# Patient Record
Sex: MALE | Race: WHITE | NOT HISPANIC OR LATINO | Employment: FULL TIME | URBAN - METROPOLITAN AREA
[De-identification: names, ages, dates, MRNs, and addresses within clinical notes are randomized per-mention and may not be internally consistent; named-entity substitution may affect disease eponyms.]

---

## 2020-02-05 ENCOUNTER — OFFICE VISIT (OUTPATIENT)
Dept: FAMILY MEDICINE CLINIC | Facility: CLINIC | Age: 20
End: 2020-02-05
Payer: COMMERCIAL

## 2020-02-05 VITALS
BODY MASS INDEX: 21.86 KG/M2 | TEMPERATURE: 99.3 F | OXYGEN SATURATION: 97 % | HEART RATE: 91 BPM | SYSTOLIC BLOOD PRESSURE: 106 MMHG | WEIGHT: 147.6 LBS | RESPIRATION RATE: 16 BRPM | DIASTOLIC BLOOD PRESSURE: 76 MMHG | HEIGHT: 69 IN

## 2020-02-05 DIAGNOSIS — Z11.4 SCREENING FOR HIV (HUMAN IMMUNODEFICIENCY VIRUS): ICD-10-CM

## 2020-02-05 DIAGNOSIS — Z00.00 WELL ADULT EXAM: Primary | ICD-10-CM

## 2020-02-05 DIAGNOSIS — E55.9 VITAMIN D DEFICIENCY: ICD-10-CM

## 2020-02-05 DIAGNOSIS — Z11.1 SCREENING-PULMONARY TB: ICD-10-CM

## 2020-02-05 LAB
BASOPHILS # BLD AUTO: 0.03 THOUSANDS/ΜL (ref 0–0.1)
BASOPHILS NFR BLD AUTO: 0 % (ref 0–1)
EOSINOPHIL # BLD AUTO: 0.08 THOUSAND/ΜL (ref 0–0.61)
EOSINOPHIL NFR BLD AUTO: 1 % (ref 0–6)
ERYTHROCYTE [DISTWIDTH] IN BLOOD BY AUTOMATED COUNT: 14.1 % (ref 11.6–15.1)
HCT VFR BLD AUTO: 45.4 % (ref 36.5–49.3)
HGB BLD-MCNC: 14.8 G/DL (ref 12–17)
IMM GRANULOCYTES # BLD AUTO: 0.02 THOUSAND/UL (ref 0–0.2)
IMM GRANULOCYTES NFR BLD AUTO: 0 % (ref 0–2)
LYMPHOCYTES # BLD AUTO: 1.68 THOUSANDS/ΜL (ref 0.6–4.47)
LYMPHOCYTES NFR BLD AUTO: 19 % (ref 14–44)
MCH RBC QN AUTO: 27.7 PG (ref 26.8–34.3)
MCHC RBC AUTO-ENTMCNC: 32.6 G/DL (ref 31.4–37.4)
MCV RBC AUTO: 85 FL (ref 82–98)
MONOCYTES # BLD AUTO: 0.7 THOUSAND/ΜL (ref 0.17–1.22)
MONOCYTES NFR BLD AUTO: 8 % (ref 4–12)
NEUTROPHILS # BLD AUTO: 6.35 THOUSANDS/ΜL (ref 1.85–7.62)
NEUTS SEG NFR BLD AUTO: 72 % (ref 43–75)
NRBC BLD AUTO-RTO: 0 /100 WBCS
PLATELET # BLD AUTO: 320 THOUSANDS/UL (ref 149–390)
PMV BLD AUTO: 10.7 FL (ref 8.9–12.7)
RBC # BLD AUTO: 5.35 MILLION/UL (ref 3.88–5.62)
WBC # BLD AUTO: 8.86 THOUSAND/UL (ref 4.31–10.16)

## 2020-02-05 PROCEDURE — 36415 COLL VENOUS BLD VENIPUNCTURE: CPT | Performed by: NURSE PRACTITIONER

## 2020-02-05 PROCEDURE — 82306 VITAMIN D 25 HYDROXY: CPT | Performed by: NURSE PRACTITIONER

## 2020-02-05 PROCEDURE — 80053 COMPREHEN METABOLIC PANEL: CPT | Performed by: NURSE PRACTITIONER

## 2020-02-05 PROCEDURE — 85025 COMPLETE CBC W/AUTO DIFF WBC: CPT | Performed by: NURSE PRACTITIONER

## 2020-02-05 PROCEDURE — 87389 HIV-1 AG W/HIV-1&-2 AB AG IA: CPT | Performed by: NURSE PRACTITIONER

## 2020-02-05 PROCEDURE — 99395 PREV VISIT EST AGE 18-39: CPT | Performed by: NURSE PRACTITIONER

## 2020-02-05 PROCEDURE — 80061 LIPID PANEL: CPT | Performed by: NURSE PRACTITIONER

## 2020-02-05 PROCEDURE — 86580 TB INTRADERMAL TEST: CPT

## 2020-02-05 PROCEDURE — 3008F BODY MASS INDEX DOCD: CPT | Performed by: NURSE PRACTITIONER

## 2020-02-05 NOTE — PROGRESS NOTES
Oscar Suggs is a 23 y o   male and is here for routine health maintenance  The patient reports no problems  History of Present Illness     HPI    Well Adult Physical   Patient here for a comprehensive physical exam       Diet and Physical Activity  Diet: trying to eat healthy   Weight concerns: None, patient's BMI is between 18 5-24 9  Exercise: several times per week      Depression Screen  PHQ-9 Depression Screening    PHQ-9:    Frequency of the following problems over the past two weeks:       Little interest or pleasure in doing things:  0 - not at all  Feeling down, depressed, or hopeless:  0 - not at all  Trouble falling or staying asleep, or sleeping too much:  0 - not at all  Feeling tired or having little energy:  0 - not at all  Poor appetite or overeatin - not at all  Feeling bad about yourself - or that you are a failure or have let yourself or your family down:  0 - not at all  Trouble concentrating on things, such as reading the newspaper or watching television:  0 - not at all  Moving or speaking so slowly that other people could have noticed   Or the opposite - being so fidgety or restless that you have been moving around a lot more than usual:  0 - not at all  Thoughts that you would be better off dead, or of hurting yourself in some way:  0 - not at all  PHQ-2 Score:  0          General Health  Hearing: Normal:  bilateral  Vision: no vision problems, goes for regular eye exams and most recent eye exam <1 year  Dental: regular dental visits, brushes teeth once daily and flosses teeth occasionally          Smoker vaping   Annual screening with low-dose helical computed tomography (CT) for patients age 54 to 76 years with history of smoking at least 30 pack-years and, if a former smoker, had quit within the previous 15 years      The following portions of the patient's history were reviewed and updated as appropriate: allergies, current medications, past family history, past medical history, past social history, past surgical history and problem list     Review of Systems     Review of Systems   Constitutional: Negative for chills, fatigue and fever  HENT: Negative for congestion, ear pain, postnasal drip, rhinorrhea, sinus pressure and sore throat  Eyes: Negative for pain and visual disturbance  Respiratory: Negative for cough, shortness of breath and wheezing  Cardiovascular: Negative for chest pain, palpitations and leg swelling  Gastrointestinal: Negative for abdominal pain, blood in stool, constipation, diarrhea, nausea and vomiting  Endocrine: Negative for cold intolerance, heat intolerance, polydipsia, polyphagia and polyuria  Genitourinary: Negative for discharge, dysuria, flank pain, frequency, scrotal swelling, testicular pain and urgency  Musculoskeletal: Negative for arthralgias, back pain and gait problem  Skin: Negative for rash  Allergic/Immunologic: Negative for environmental allergies and food allergies  Neurological: Negative for dizziness and headaches  Hematological: Does not bruise/bleed easily  Psychiatric/Behavioral: Negative for dysphoric mood  The patient is not nervous/anxious          Past Medical History     Past Medical History:   Diagnosis Date    Acne     History of bilateral inguinal hernias        Past Surgical History     Past Surgical History:   Procedure Laterality Date    INGUINAL HERNIA REPAIR  2000       Social History     Social History     Socioeconomic History    Marital status: Single     Spouse name: None    Number of children: None    Years of education: None    Highest education level: None   Occupational History    None   Social Needs    Financial resource strain: None    Food insecurity:     Worry: None     Inability: None    Transportation needs:     Medical: None     Non-medical: None   Tobacco Use    Smoking status: Never Smoker    Smokeless tobacco: Never Used   Substance and Sexual Activity  Alcohol use: Not Currently     Frequency: Never    Drug use: Never    Sexual activity: Yes     Partners: Female     Birth control/protection: Condom Male   Lifestyle    Physical activity:     Days per week: None     Minutes per session: None    Stress: None   Relationships    Social connections:     Talks on phone: None     Gets together: None     Attends Yazidism service: None     Active member of club or organization: None     Attends meetings of clubs or organizations: None     Relationship status: None    Intimate partner violence:     Fear of current or ex partner: None     Emotionally abused: None     Physically abused: None     Forced sexual activity: None   Other Topics Concern    None   Social History Narrative    Attends [employer], grade 12, B's and C's       Family History     Family History   Problem Relation Age of Onset    No Known Problems Mother     No Known Problems Father     No Known Problems Maternal Grandmother     No Known Problems Maternal Grandfather     No Known Problems Paternal Grandmother     No Known Problems Paternal Grandfather        Current Medications     No current outpatient medications on file  Allergies     No Known Allergies    Objective     /76 (BP Location: Left arm, Patient Position: Sitting, Cuff Size: Adult)   Pulse 91   Temp 99 3 °F (37 4 °C) (Tympanic)   Resp 16   Ht 5' 9" (1 753 m)   Wt 67 kg (147 lb 9 6 oz)   SpO2 97%   BMI 21 80 kg/m²      Physical Exam   Constitutional: He is oriented to person, place, and time  He appears well-developed and well-nourished  HENT:   Head: Normocephalic  Right Ear: Tympanic membrane, external ear and ear canal normal    Left Ear: Tympanic membrane, external ear and ear canal normal    Nose: Nose normal    Eyes: Pupils are equal, round, and reactive to light  Conjunctivae and EOM are normal    Neck: Normal range of motion  Neck supple  No thyromegaly present     Cardiovascular: Normal rate, regular rhythm and normal heart sounds  Pulmonary/Chest: Effort normal and breath sounds normal    Abdominal: Soft  Bowel sounds are normal  He exhibits no distension and no mass  There is no tenderness  There is no rebound and no guarding  Musculoskeletal: Normal range of motion  Lymphadenopathy:     He has no cervical adenopathy  Neurological: He is alert and oriented to person, place, and time  He has normal reflexes  Skin: Skin is warm and dry  Psychiatric: He has a normal mood and affect  His behavior is normal          No exam data present    Health Maintenance     Health Maintenance   Topic Date Due    HPV Vaccine (1 - Male 2-dose series) 08/19/2011    HIV Screening  08/19/2015    DTaP,Tdap,and Td Vaccines (2 - Td) 08/09/2016    Annual Physical  08/19/2018    Influenza Vaccine  07/01/2019    Depression Screening PHQ  02/05/2021    BMI: Adult  02/05/2021    Pneumococcal Vaccine: 65+ Years (1 of 2 - PCV13) 08/19/2065    Pneumococcal Vaccine: Pediatrics (0 to 5 Years) and At-Risk Patients (6 to 59 Years)  Aged Out    HIB Vaccine  Aged Out    Hepatitis B Vaccine  Aged Out    IPV Vaccine  Aged Out    Hepatitis A Vaccine  Aged Out    Meningococcal ACWY Vaccine  Aged [employer] History   Administered Date(s) Administered    Tdap 07/12/2016       Laboratory Results:   No results found for: WBC, HGB, HCT, MCV, PLT  No results found for: BUN  No results found for: GLUC, ALT, AST  No results found for: TSH  No results found for: HGBA1C    Lipid Profile:   No results found for: CHOL  No results found for: HDL  No results found for: LDLC  No results found for: LDLCALC  No results found for: TRIG    Assessment/Plan   1  Well adult exam  - CBC and differential  - Comprehensive metabolic panel  - Lipid panel    2  Vitamin D deficiency  - Vitamin D 25 hydroxy    3  Screening for HIV (human immunodeficiency virus)  - HIV 1/2 AG-AB combo    4   Screening-pulmonary TB  - TB Skin Test    1  Healthy male exam   2  Patient Counseling:   · Nutrition: Stressed importance of a well balanced diet, moderation of sodium/saturated fat, caloric balance and sufficient intake of fiber  · Exercise: Stressed the importance of regular exercise with a goal of 150 minutes per week  · Dental Health: Discussed daily flossing and brushing and regular dental visits   · Sexuality: Discussed sexually transmitted infections, use of condoms and prevention of unintended pregnancy  · Alcohol Use:  Recommended moderation of alcohol intake  · Injury Prevention: Discussed Safety Belts, Safety Helmets, and Smoke Detectors    · Immunizations reviewed  yes  · Discussed benefits of screening yes  · Discussed the patient's BMI with him  The BMI is in the acceptable range  3  Cancer Screening N/A  4  Labs ordered  5  Follow up next physical in 1 year      DIANA Garcia

## 2020-02-05 NOTE — PATIENT INSTRUCTIONS
Testicular Self-examination   WHAT YOU NEED TO KNOW:   A testicular self-examination (LEAH) is a way to check your testicles for lumps and other changes  The main sign of testicular cancer is a lump on the testicle  TSEs can help you learn how your testicles normally look and feel  Regular TSEs can help you find lumps or changes that you should tell your healthcare provider about  Testicular cancer is often curable if it is found early  Ask your healthcare provider to teach you how to do a LEAH if you are not sure how to do it correctly  DISCHARGE INSTRUCTIONS:   Contact your healthcare provider if:   · You have any questions about how to do a LEAH  · You have aching or discomfort in your lower abdomen or groin  · You find any lumps or changes in your testicles  When to do a LEAH:  You may start checking your testicles regularly after you have gone through puberty  An easy way to remember to do a LEAH is to do the exam on the same day of each month  Talk to your healthcare provider about how often to do TSEs  The best time is after a warm shower or bath  This is when your scrotum is most relaxed  How to do a LEAH:   ·  front of the mirror and look at your scrotum  Look for changes in its shape, size, and color  It may be normal for one side of your scrotum to appear larger or hang lower than the other  It is also normal for one testicle to be a little larger than the other  · Use both hands to examine each testicle  Hold 1 testicle between your thumbs and pointer fingers  Gently roll the testicle between your thumbs and fingers  Use some pressure as you roll, but do not squeeze the testicle  A LEAH should not cause pain  Feel for lumps or changes in the testicle and scrotum  Repeat these steps for the other testicle  Follow up with your healthcare provider as directed:  A LEAH should not be the only cancer screening you have   You will still need regular exams to check for cancer or other problems that need to be treated  Ask your healthcare provider how often to be checked  Write down your questions so you remember to ask them during your visits  © 2017 [address] Information is for End User's use only and may not be sold, redistributed or otherwise used for commercial purposes  All illustrations and images included in CareNotes® are the copyrighted property of A D A M , Inc  or Celso Fiore  The above information is an  only  It is not intended as medical advice for individual conditions or treatments  Talk to your doctor, nurse or pharmacist before following any medical regimen to see if it is safe and effective for you

## 2020-02-06 LAB
25(OH)D3 SERPL-MCNC: 22 NG/ML (ref 30–100)
ALBUMIN SERPL BCP-MCNC: 4.4 G/DL (ref 3.5–5)
ALP SERPL-CCNC: 84 U/L (ref 46–484)
ALT SERPL W P-5'-P-CCNC: 16 U/L (ref 12–78)
ANION GAP SERPL CALCULATED.3IONS-SCNC: 6 MMOL/L (ref 4–13)
AST SERPL W P-5'-P-CCNC: 12 U/L (ref 5–45)
BILIRUB SERPL-MCNC: 0.49 MG/DL (ref 0.2–1)
BUN SERPL-MCNC: 12 MG/DL (ref 5–25)
CALCIUM SERPL-MCNC: 9.5 MG/DL (ref 8.3–10.1)
CHLORIDE SERPL-SCNC: 107 MMOL/L (ref 100–108)
CHOLEST SERPL-MCNC: 128 MG/DL (ref 50–200)
CO2 SERPL-SCNC: 28 MMOL/L (ref 21–32)
CREAT SERPL-MCNC: 0.92 MG/DL (ref 0.6–1.3)
GFR SERPL CREATININE-BSD FRML MDRD: 120 ML/MIN/1.73SQ M
GLUCOSE P FAST SERPL-MCNC: 84 MG/DL (ref 65–99)
HDLC SERPL-MCNC: 46 MG/DL
LDLC SERPL CALC-MCNC: 74 MG/DL (ref 0–100)
NONHDLC SERPL-MCNC: 82 MG/DL
POTASSIUM SERPL-SCNC: 4.3 MMOL/L (ref 3.5–5.3)
PROT SERPL-MCNC: 8.7 G/DL (ref 6.4–8.2)
SODIUM SERPL-SCNC: 141 MMOL/L (ref 136–145)
TRIGL SERPL-MCNC: 38 MG/DL

## 2020-02-07 ENCOUNTER — CLINICAL SUPPORT (OUTPATIENT)
Dept: FAMILY MEDICINE CLINIC | Facility: CLINIC | Age: 20
End: 2020-02-07

## 2020-02-07 DIAGNOSIS — Z11.1 ENCOUNTER FOR PPD SKIN TEST READING: Primary | ICD-10-CM

## 2020-02-07 LAB
HIV 1+2 AB+HIV1 P24 AG SERPL QL IA: NORMAL
INDURATION: 0.1 MM
TB SKIN TEST: NEGATIVE

## 2020-02-10 ENCOUNTER — DOCUMENTATION (OUTPATIENT)
Dept: FAMILY MEDICINE CLINIC | Facility: CLINIC | Age: 20
End: 2020-02-10

## 2021-03-31 ENCOUNTER — TELEPHONE (OUTPATIENT)
Dept: ADMINISTRATIVE | Facility: OTHER | Age: 21
End: 2021-03-31

## 2021-03-31 ENCOUNTER — OFFICE VISIT (OUTPATIENT)
Dept: FAMILY MEDICINE CLINIC | Facility: CLINIC | Age: 21
End: 2021-03-31
Payer: COMMERCIAL

## 2021-03-31 VITALS
WEIGHT: 167 LBS | DIASTOLIC BLOOD PRESSURE: 76 MMHG | HEIGHT: 69 IN | SYSTOLIC BLOOD PRESSURE: 126 MMHG | RESPIRATION RATE: 20 BRPM | TEMPERATURE: 98.1 F | HEART RATE: 96 BPM | OXYGEN SATURATION: 97 % | BODY MASS INDEX: 24.73 KG/M2

## 2021-03-31 DIAGNOSIS — Z00.00 WELL ADULT EXAM: Primary | ICD-10-CM

## 2021-03-31 DIAGNOSIS — E55.9 VITAMIN D DEFICIENCY: ICD-10-CM

## 2021-03-31 PROCEDURE — 3008F BODY MASS INDEX DOCD: CPT | Performed by: NURSE PRACTITIONER

## 2021-03-31 PROCEDURE — 3725F SCREEN DEPRESSION PERFORMED: CPT | Performed by: NURSE PRACTITIONER

## 2021-03-31 PROCEDURE — 99395 PREV VISIT EST AGE 18-39: CPT | Performed by: NURSE PRACTITIONER

## 2021-03-31 PROCEDURE — 1036F TOBACCO NON-USER: CPT | Performed by: NURSE PRACTITIONER

## 2021-03-31 NOTE — TELEPHONE ENCOUNTER
Upon review of the In Basket request we were able to locate, review, and update the patient chart as requested for Immunization(s) Tdap is already updated to  from 7/12/16  Any additional questions or concerns should be emailed to the Practice Liaisons via [email or web address] email, please do not reply via In Basket      Thank you  Jhonathan Chris

## 2021-03-31 NOTE — PATIENT INSTRUCTIONS
Testicular Self-examination   AMBULATORY CARE:   A testicular self-examination (LEAH)  is a way to check your testicles for lumps and other changes  The main sign of testicular cancer is a lump on the testicle  TSEs can help you learn how your testicles normally look and feel  Regular TSEs can help you find lumps or changes that you should tell your healthcare provider about  Testicular cancer is often curable if it is found early  Ask your healthcare provider to teach you how to do a LEAH if you are not sure how to do it correctly  When to do a LEAH:  You may start checking your testicles regularly after you have gone through puberty  An easy way to remember to do a LEAH is to do the exam on the same day of each month  Talk to your healthcare provider about how often to do TSEs  The best time is after a warm shower or bath  This is when your scrotum is most relaxed  How to do a LEAH:   ·  front of the mirror and look at your scrotum  Look for changes in its shape, size, and color  It may be normal for one side of your scrotum to appear larger or hang lower than the other  It is also normal for one testicle to be a little larger than the other  · Use both hands to examine each testicle  Hold 1 testicle between your thumbs and pointer fingers  Gently roll the testicle between your thumbs and fingers  Use some pressure as you roll, but do not squeeze the testicle  A LEAH should not cause pain  Feel for lumps or changes in the testicle and scrotum  Repeat these steps for the other testicle  Contact your healthcare provider if:   · You have any questions about how to do a LEAH  · You have aching or discomfort in your lower abdomen or groin  · You find any lumps or changes in your testicles  Follow up with your healthcare provider as directed:  A LEAH should not be the only cancer screening you have  You will still need regular exams to check for cancer or other problems that need to be treated   Ask your healthcare provider how often to be checked  Write down your questions so you remember to ask them during your visits  © Copyright [address] Information is for End User's use only and may not be sold, redistributed or otherwise used for commercial purposes  All illustrations and images included in CareNotes® are the copyrighted property of A D A M , Inc  or Rufus Casas   The above information is an  only  It is not intended as medical advice for individual conditions or treatments  Talk to your doctor, nurse or pharmacist before following any medical regimen to see if it is safe and effective for you

## 2021-03-31 NOTE — PROGRESS NOTES
Tabitha Abbott is a 21 y o   male and is here for routine health maintenance  The patient reports no problems  History of Present Illness     HPI    Well Adult Physical   Patient here for a comprehensive physical exam       Diet and Physical Activity  Diet: well balanced diet  Weight concerns: None, patient's BMI is between 18 5-24 9  Exercise: daily      Depression Screen  PHQ-9 Depression Screening    PHQ-9:   Frequency of the following problems over the past two weeks:      Little interest or pleasure in doing things: 0 - not at all  Feeling down, depressed, or hopeless: 0 - not at all  PHQ-2 Score: 0          General Health  Hearing: Normal:  bilateral  Vision: vision problems: Color Blind and most recent eye exam >1 year  Dental: regular dental visits, brushes teeth once daily and flosses teeth occasionally      Cancer Screening  Colononoscopy Not indicated  PSA Not indicated    Smoker Non-smoker   Annual screening with low-dose helical computed tomography (CT) for patients age 54 to 76 years with history of smoking at least 30 pack-years and, if a former smoker, had quit within the previous 15 years      The following portions of the patient's history were reviewed and updated as appropriate: allergies, current medications, past family history, past medical history, past social history, past surgical history and problem list     Review of Systems     Review of Systems   Constitutional: Negative for chills, fatigue and fever  HENT: Negative for congestion, ear pain, postnasal drip, rhinorrhea, sinus pressure and sore throat  Eyes: Negative for pain and visual disturbance  Respiratory: Negative for cough, shortness of breath and wheezing  Cardiovascular: Negative for chest pain, palpitations and leg swelling  Gastrointestinal: Negative for abdominal pain, blood in stool, constipation, diarrhea, nausea and vomiting  Endocrine: Negative for cold intolerance and heat intolerance  Genitourinary: Negative for discharge, dysuria, flank pain, frequency, hematuria, scrotal swelling, testicular pain and urgency  Musculoskeletal: Negative for arthralgias, back pain and gait problem  Skin: Negative for rash  Allergic/Immunologic: Positive for environmental allergies (seasonal)  Negative for food allergies  Neurological: Negative for dizziness and headaches  Hematological: Does not bruise/bleed easily  Psychiatric/Behavioral: Negative for dysphoric mood  The patient is not nervous/anxious  All other systems reviewed and are negative  Past Medical History     Past Medical History:   Diagnosis Date    Acne     History of bilateral inguinal hernias        Past Surgical History     Past Surgical History:   Procedure Laterality Date    INGUINAL HERNIA REPAIR  2000       Social History     Social History     Socioeconomic History    Marital status: Single     Spouse name: None    Number of children: None    Years of education: None    Highest education level: None   Occupational History    None   Social Needs    Financial resource strain: None    Food insecurity     Worry: None     Inability: None    Transportation needs     Medical: None     Non-medical: None   Tobacco Use    Smoking status: Never Smoker    Smokeless tobacco: Former User   Substance and Sexual Activity    Alcohol use:  Yes     Alcohol/week: 3 0 standard drinks     Types: 1 Glasses of wine, 1 Cans of beer, 1 Shots of liquor per week     Frequency: Monthly or less     Drinks per session: 3 or 4     Binge frequency: Never    Drug use: Yes     Types: Marijuana    Sexual activity: Not Currently     Partners: Female     Birth control/protection: Condom Male   Lifestyle    Physical activity     Days per week: None     Minutes per session: None    Stress: None   Relationships    Social connections     Talks on phone: None     Gets together: None     Attends Yarsani service: None     Active member of club or organization: None     Attends meetings of clubs or organizations: None     Relationship status: None    Intimate partner violence     Fear of current or ex partner: None     Emotionally abused: None     Physically abused: None     Forced sexual activity: None   Other Topics Concern    None   Social History Narrative    Attends [employer], grade 12, B's and C's       Family History     Family History   Problem Relation Age of Onset    No Known Problems Mother     No Known Problems Father     No Known Problems Maternal Grandmother     No Known Problems Maternal Grandfather     No Known Problems Paternal Grandmother     No Known Problems Paternal Grandfather        Current Medications     No current outpatient medications on file  Allergies     No Known Allergies    Objective     /76 (BP Location: Left arm, Patient Position: Sitting, Cuff Size: Standard)   Pulse 96   Temp 98 1 °F (36 7 °C) (Temporal)   Resp 20   Ht 5' 9" (1 753 m)   Wt 75 8 kg (167 lb)   SpO2 97%   BMI 24 66 kg/m²      Physical Exam  Constitutional:       Appearance: Normal appearance  HENT:      Head: Normocephalic  Right Ear: Tympanic membrane normal       Left Ear: Tympanic membrane normal       Nose: Nose normal       Mouth/Throat:      Mouth: Mucous membranes are moist    Eyes:      Extraocular Movements: Extraocular movements intact  Conjunctiva/sclera: Conjunctivae normal       Pupils: Pupils are equal, round, and reactive to light  Neck:      Musculoskeletal: Normal range of motion  No muscular tenderness  Cardiovascular:      Rate and Rhythm: Normal rate and regular rhythm  Pulses: Normal pulses  Heart sounds: Normal heart sounds  Pulmonary:      Effort: Pulmonary effort is normal       Breath sounds: Normal breath sounds  Abdominal:      General: Abdomen is flat  Bowel sounds are normal       Tenderness: There is no abdominal tenderness   There is no right CVA tenderness or left CVA tenderness  Musculoskeletal: Normal range of motion  General: No swelling or tenderness  Right lower leg: No edema  Left lower leg: No edema  Skin:     General: Skin is warm and dry  Neurological:      Mental Status: He is alert and oriented to person, place, and time  Psychiatric:         Mood and Affect: Mood normal          Behavior: Behavior normal            No exam data present    Health Maintenance     Health Maintenance   Topic Date Due    HPV Vaccine (1 - Male 2-dose series) Never done    DTaP,Tdap,and Td Vaccines (2 - Td) 08/09/2016    Annual Physical  02/05/2021    Influenza Vaccine (1) 06/30/2021 (Originally 9/1/2020)    Depression Screening PHQ  03/31/2022    BMI: Adult  03/31/2022    HIV Screening  Completed    Pneumococcal Vaccine: Pediatrics (0 to 5 Years) and At-Risk Patients (6 to 59 Years)  Aged Out    HIB Vaccine  Aged Out    Hepatitis B Vaccine  Aged Out    IPV Vaccine  Aged Out    Hepatitis A Vaccine  Aged Out    Meningococcal ACWY Vaccine  Aged [employer] History   Administered Date(s) Administered    Tdap 07/12/2016    Tuberculin Skin Test-PPD Intradermal 02/05/2020       Laboratory Results:   Lab Results   Component Value Date    WBC 8 86 02/05/2020    HGB 14 8 02/05/2020    HCT 45 4 02/05/2020    MCV 85 02/05/2020     02/05/2020     Lab Results   Component Value Date    BUN 12 02/05/2020     Lab Results   Component Value Date    ALT 16 02/05/2020    AST 12 02/05/2020     No results found for: TSH  No results found for: HGBA1C    Lipid Profile:   No results found for: CHOL  Lab Results   Component Value Date    HDL 46 02/05/2020     No results found for: [facility]  Lab Results   Component Value Date    LDLCALC 74 02/05/2020     Lab Results   Component Value Date    TRIG 38 02/05/2020       Assessment/Plan   1  Well adult exam  - CBC and differential; Future  - Comprehensive metabolic panel; Future  - Lipid panel; Future    2  Vitamin D deficiency  - Vitamin D 25 hydroxy; Future      1  Healthy male exam   2  Patient Counseling:   · Nutrition: Stressed importance of a well balanced diet, moderation of sodium/saturated fat, caloric balance and sufficient intake of fiber  · Exercise: Stressed the importance of regular exercise with a goal of 150 minutes per week  · Dental Health: Discussed daily flossing and brushing and regular dental visits     · Immunizations reviewed  Reviewed  · Discussed benefits of screening Discussed and provided patient patient with education  · Discussed the patient's BMI with him  The BMI is in the acceptable range  3  Cancer Screening Not indicated  4  Labs Ordered  6  Follow up next physical in 1 year      DIANA Thomas

## 2021-03-31 NOTE — TELEPHONE ENCOUNTER
----- Message from [place], [place] sent at 3/31/2021 10:18 AM EDT -----  Tdap  done  2016    in immunizations

## 2021-04-01 ENCOUNTER — APPOINTMENT (OUTPATIENT)
Dept: LAB | Facility: MEDICAL CENTER | Age: 21
End: 2021-04-01
Payer: COMMERCIAL

## 2021-04-01 DIAGNOSIS — Z00.00 WELL ADULT EXAM: ICD-10-CM

## 2021-04-01 DIAGNOSIS — E55.9 VITAMIN D DEFICIENCY: ICD-10-CM

## 2021-04-01 LAB
25(OH)D3 SERPL-MCNC: 15.4 NG/ML (ref 30–100)
ALBUMIN SERPL BCP-MCNC: 3.9 G/DL (ref 3.5–5)
ALP SERPL-CCNC: 72 U/L (ref 46–116)
ALT SERPL W P-5'-P-CCNC: 20 U/L (ref 12–78)
ANION GAP SERPL CALCULATED.3IONS-SCNC: 4 MMOL/L (ref 4–13)
AST SERPL W P-5'-P-CCNC: 11 U/L (ref 5–45)
BASOPHILS # BLD AUTO: 0.02 THOUSANDS/ΜL (ref 0–0.1)
BASOPHILS NFR BLD AUTO: 0 % (ref 0–1)
BILIRUB SERPL-MCNC: 0.35 MG/DL (ref 0.2–1)
BUN SERPL-MCNC: 7 MG/DL (ref 5–25)
CALCIUM SERPL-MCNC: 9.2 MG/DL (ref 8.3–10.1)
CHLORIDE SERPL-SCNC: 106 MMOL/L (ref 100–108)
CHOLEST SERPL-MCNC: 111 MG/DL (ref 50–200)
CO2 SERPL-SCNC: 30 MMOL/L (ref 21–32)
CREAT SERPL-MCNC: 0.97 MG/DL (ref 0.6–1.3)
EOSINOPHIL # BLD AUTO: 0.19 THOUSAND/ΜL (ref 0–0.61)
EOSINOPHIL NFR BLD AUTO: 2 % (ref 0–6)
ERYTHROCYTE [DISTWIDTH] IN BLOOD BY AUTOMATED COUNT: 13.7 % (ref 11.6–15.1)
GFR SERPL CREATININE-BSD FRML MDRD: 112 ML/MIN/1.73SQ M
GLUCOSE P FAST SERPL-MCNC: 94 MG/DL (ref 65–99)
HCT VFR BLD AUTO: 47.3 % (ref 36.5–49.3)
HDLC SERPL-MCNC: 52 MG/DL
HGB BLD-MCNC: 14.8 G/DL (ref 12–17)
IMM GRANULOCYTES # BLD AUTO: 0.04 THOUSAND/UL (ref 0–0.2)
IMM GRANULOCYTES NFR BLD AUTO: 0 % (ref 0–2)
LDLC SERPL CALC-MCNC: 50 MG/DL (ref 0–100)
LYMPHOCYTES # BLD AUTO: 1.94 THOUSANDS/ΜL (ref 0.6–4.47)
LYMPHOCYTES NFR BLD AUTO: 19 % (ref 14–44)
MCH RBC QN AUTO: 27.3 PG (ref 26.8–34.3)
MCHC RBC AUTO-ENTMCNC: 31.3 G/DL (ref 31.4–37.4)
MCV RBC AUTO: 87 FL (ref 82–98)
MONOCYTES # BLD AUTO: 0.88 THOUSAND/ΜL (ref 0.17–1.22)
MONOCYTES NFR BLD AUTO: 9 % (ref 4–12)
NEUTROPHILS # BLD AUTO: 7.19 THOUSANDS/ΜL (ref 1.85–7.62)
NEUTS SEG NFR BLD AUTO: 70 % (ref 43–75)
NONHDLC SERPL-MCNC: 59 MG/DL
NRBC BLD AUTO-RTO: 0 /100 WBCS
PLATELET # BLD AUTO: 228 THOUSANDS/UL (ref 149–390)
PMV BLD AUTO: 10.7 FL (ref 8.9–12.7)
POTASSIUM SERPL-SCNC: 3.9 MMOL/L (ref 3.5–5.3)
PROT SERPL-MCNC: 7.7 G/DL (ref 6.4–8.2)
RBC # BLD AUTO: 5.43 MILLION/UL (ref 3.88–5.62)
SODIUM SERPL-SCNC: 140 MMOL/L (ref 136–145)
TRIGL SERPL-MCNC: 43 MG/DL
WBC # BLD AUTO: 10.26 THOUSAND/UL (ref 4.31–10.16)

## 2021-04-01 PROCEDURE — 80061 LIPID PANEL: CPT

## 2021-04-01 PROCEDURE — 82306 VITAMIN D 25 HYDROXY: CPT

## 2021-04-01 PROCEDURE — 85025 COMPLETE CBC W/AUTO DIFF WBC: CPT

## 2021-04-01 PROCEDURE — 36415 COLL VENOUS BLD VENIPUNCTURE: CPT

## 2021-04-01 PROCEDURE — 80053 COMPREHEN METABOLIC PANEL: CPT

## 2021-04-14 ENCOUNTER — TELEPHONE (OUTPATIENT)
Dept: FAMILY MEDICINE CLINIC | Facility: CLINIC | Age: 21
End: 2021-04-14

## 2021-04-14 NOTE — TELEPHONE ENCOUNTER
Iris from Dr Meka Hancock office called - patient is scheduled for complete bony impacted tooth extraction tomorrow, wants to know if you are agreeable to sign a clearance  Patient was seen 3/31/21 for yearly PE   Please advise

## 2021-12-22 ENCOUNTER — OFFICE VISIT (OUTPATIENT)
Dept: FAMILY MEDICINE CLINIC | Facility: CLINIC | Age: 21
End: 2021-12-22
Payer: COMMERCIAL

## 2021-12-22 VITALS
HEIGHT: 69 IN | WEIGHT: 174 LBS | SYSTOLIC BLOOD PRESSURE: 122 MMHG | RESPIRATION RATE: 18 BRPM | HEART RATE: 97 BPM | TEMPERATURE: 98.1 F | BODY MASS INDEX: 25.77 KG/M2 | OXYGEN SATURATION: 99 % | DIASTOLIC BLOOD PRESSURE: 74 MMHG

## 2021-12-22 DIAGNOSIS — R19.5 MUCUS IN STOOL: ICD-10-CM

## 2021-12-22 DIAGNOSIS — R19.5 OCCULT BLOOD POSITIVE STOOL: Primary | ICD-10-CM

## 2021-12-22 PROCEDURE — 1036F TOBACCO NON-USER: CPT | Performed by: NURSE PRACTITIONER

## 2021-12-22 PROCEDURE — 99214 OFFICE O/P EST MOD 30 MIN: CPT | Performed by: NURSE PRACTITIONER

## 2021-12-22 PROCEDURE — 3008F BODY MASS INDEX DOCD: CPT | Performed by: NURSE PRACTITIONER

## 2021-12-23 ENCOUNTER — APPOINTMENT (OUTPATIENT)
Dept: LAB | Facility: MEDICAL CENTER | Age: 21
End: 2021-12-23
Payer: COMMERCIAL

## 2021-12-23 LAB
ALBUMIN SERPL BCP-MCNC: 4 G/DL (ref 3.5–5)
ALP SERPL-CCNC: 84 U/L (ref 46–116)
ALT SERPL W P-5'-P-CCNC: 31 U/L (ref 12–78)
ANION GAP SERPL CALCULATED.3IONS-SCNC: 5 MMOL/L (ref 4–13)
AST SERPL W P-5'-P-CCNC: 17 U/L (ref 5–45)
BASOPHILS # BLD AUTO: 0.04 THOUSANDS/ΜL (ref 0–0.1)
BASOPHILS NFR BLD AUTO: 1 % (ref 0–1)
BILIRUB SERPL-MCNC: 0.66 MG/DL (ref 0.2–1)
BUN SERPL-MCNC: 11 MG/DL (ref 5–25)
CALCIUM SERPL-MCNC: 9.4 MG/DL (ref 8.3–10.1)
CHLORIDE SERPL-SCNC: 105 MMOL/L (ref 100–108)
CO2 SERPL-SCNC: 30 MMOL/L (ref 21–32)
CREAT SERPL-MCNC: 1.11 MG/DL (ref 0.6–1.3)
EOSINOPHIL # BLD AUTO: 0.19 THOUSAND/ΜL (ref 0–0.61)
EOSINOPHIL NFR BLD AUTO: 2 % (ref 0–6)
ERYTHROCYTE [DISTWIDTH] IN BLOOD BY AUTOMATED COUNT: 13.6 % (ref 11.6–15.1)
GFR SERPL CREATININE-BSD FRML MDRD: 94 ML/MIN/1.73SQ M
GLUCOSE P FAST SERPL-MCNC: 93 MG/DL (ref 65–99)
HCT VFR BLD AUTO: 46.9 % (ref 36.5–49.3)
HGB BLD-MCNC: 15.1 G/DL (ref 12–17)
IMM GRANULOCYTES # BLD AUTO: 0.02 THOUSAND/UL (ref 0–0.2)
IMM GRANULOCYTES NFR BLD AUTO: 0 % (ref 0–2)
LYMPHOCYTES # BLD AUTO: 2.02 THOUSANDS/ΜL (ref 0.6–4.47)
LYMPHOCYTES NFR BLD AUTO: 25 % (ref 14–44)
MCH RBC QN AUTO: 27.6 PG (ref 26.8–34.3)
MCHC RBC AUTO-ENTMCNC: 32.2 G/DL (ref 31.4–37.4)
MCV RBC AUTO: 86 FL (ref 82–98)
MONOCYTES # BLD AUTO: 0.88 THOUSAND/ΜL (ref 0.17–1.22)
MONOCYTES NFR BLD AUTO: 11 % (ref 4–12)
NEUTROPHILS # BLD AUTO: 5.11 THOUSANDS/ΜL (ref 1.85–7.62)
NEUTS SEG NFR BLD AUTO: 61 % (ref 43–75)
NRBC BLD AUTO-RTO: 0 /100 WBCS
PLATELET # BLD AUTO: 256 THOUSANDS/UL (ref 149–390)
PMV BLD AUTO: 10.3 FL (ref 8.9–12.7)
POTASSIUM SERPL-SCNC: 4.3 MMOL/L (ref 3.5–5.3)
PROT SERPL-MCNC: 8.3 G/DL (ref 6.4–8.2)
RBC # BLD AUTO: 5.48 MILLION/UL (ref 3.88–5.62)
SODIUM SERPL-SCNC: 140 MMOL/L (ref 136–145)
WBC # BLD AUTO: 8.26 THOUSAND/UL (ref 4.31–10.16)

## 2021-12-23 PROCEDURE — 85025 COMPLETE CBC W/AUTO DIFF WBC: CPT | Performed by: NURSE PRACTITIONER

## 2021-12-23 PROCEDURE — 81382 HLA II TYPING 1 LOC HR: CPT | Performed by: NURSE PRACTITIONER

## 2021-12-23 PROCEDURE — 81376 HLA II TYPING 1 LOCUS LR: CPT | Performed by: NURSE PRACTITIONER

## 2021-12-23 PROCEDURE — 36415 COLL VENOUS BLD VENIPUNCTURE: CPT | Performed by: NURSE PRACTITIONER

## 2021-12-23 PROCEDURE — 80053 COMPREHEN METABOLIC PANEL: CPT | Performed by: NURSE PRACTITIONER

## 2021-12-27 ENCOUNTER — OFFICE VISIT (OUTPATIENT)
Dept: GASTROENTEROLOGY | Facility: MEDICAL CENTER | Age: 21
End: 2021-12-27
Payer: COMMERCIAL

## 2021-12-27 VITALS
WEIGHT: 177.4 LBS | HEART RATE: 82 BPM | SYSTOLIC BLOOD PRESSURE: 100 MMHG | TEMPERATURE: 98.2 F | DIASTOLIC BLOOD PRESSURE: 80 MMHG | BODY MASS INDEX: 26.2 KG/M2

## 2021-12-27 DIAGNOSIS — R19.7 DIARRHEA, UNSPECIFIED TYPE: ICD-10-CM

## 2021-12-27 DIAGNOSIS — R19.5 OCCULT BLOOD POSITIVE STOOL: Primary | ICD-10-CM

## 2021-12-27 DIAGNOSIS — R10.30 LOWER ABDOMINAL PAIN: ICD-10-CM

## 2021-12-27 PROCEDURE — 99243 OFF/OP CNSLTJ NEW/EST LOW 30: CPT | Performed by: PHYSICIAN ASSISTANT

## 2021-12-27 NOTE — H&P (VIEW-ONLY)
Assessment/Plan:     Diagnoses and all orders for this visit:    Occult blood positive stool  Abdominal pain  Diarrhea    Patient was referred for rectal bleeding, abdominal pain and diarrhea  He states his symptoms started approximately 6 months ago but have worsened recently  He did have a CBC drawn and his hemoglobin is within normal limits  He does have a family history of Crohn's disease in his younger brother  Given his symptoms this is very concerning for inflammatory bowel disease  Would recommend ruling out infectious etiology but this is unlikely  Given he also complained of dark stools would recommend endoscopy as well as colonoscopy  Risks and benefits of the procedure were discussed and he is agreeable to proceed  Will see him back after to go over all results  If infectious workup is negative can use Imodium as needed  -     Ambulatory referral to Gastroenterology  -     Colonoscopy; Future  -     EGD; Future  -     Giardia antigen; Future  -     Calprotectin,Fecal; Future  -     Clostridium difficile toxin by PCR with EIA; Future  -     Ova and parasite examination; Future  -     Stool Enteric Bacterial Panel by PCR; Future                Subjective:      Patient ID: Chanda Anguiano is a 24 y o  male  HPI     Patient was referred for rectal bleeding  He states his symptoms started approximately 6 months ago  He initially had bleeding intermittently but over the last month he has noticed it every day in his stools  He has also noticed an increased frequency in his stools, at its worst going 5 times per day  He does admit to significant abdominal cramping, bloating and gas  He states typically the blood is red in color but also has noticed some black stools  He denies any heartburn or acid reflux  He has had some occasional nausea, no vomiting  He does have a family history of Crohn's disease in his younger brother  Also has a family history of celiac disease in his uncle    He has never had an endoscopy or colonoscopy  There is no problem list on file for this patient  No Known Allergies  No current outpatient medications on file prior to visit  No current facility-administered medications on file prior to visit  Family History   Problem Relation Age of Onset    No Known Problems Mother     No Known Problems Father     No Known Problems Maternal Grandmother     No Known Problems Maternal Grandfather     No Known Problems Paternal Grandmother     No Known Problems Paternal Grandfather      Past Medical History:   Diagnosis Date    Acne     History of bilateral inguinal hernias      Social History     Socioeconomic History    Marital status: Single     Spouse name: None    Number of children: None    Years of education: None    Highest education level: None   Occupational History    None   Tobacco Use    Smoking status: Never Smoker    Smokeless tobacco: Former User   Substance and Sexual Activity    Alcohol use: Yes     Alcohol/week: 3 0 standard drinks     Types: 1 Glasses of wine, 1 Cans of beer, 1 Shots of liquor per week    Drug use: Yes     Types: Marijuana    Sexual activity: Not Currently     Partners: Female     Birth control/protection: Condom Male   Other Topics Concern    None   Social History Narrative    Attends [employer], grade 12, B's and C's     Social Determinants of Health     Financial Resource Strain: Not on file   Food Insecurity: Not on file   Transportation Needs: Not on file   Physical Activity: Not on file   Stress: Not on file   Social Connections: Not on file   Intimate Partner Violence: Not on file   Housing Stability: Not on file     Past Surgical History:   Procedure Laterality Date   [address]         Review of Systems   All other systems reviewed and are negative          Objective:      /80   Pulse 82   Temp 98 2 °F (36 8 °C)   Wt 80 5 kg (177 lb 6 4 oz)   BMI 26 20 kg/m²          Physical Exam  Constitutional:       Appearance: Normal appearance  He is well-developed  HENT:      Head: Normocephalic and atraumatic  Eyes:      Conjunctiva/sclera: Conjunctivae normal    Cardiovascular:      Rate and Rhythm: Normal rate and regular rhythm  Pulmonary:      Effort: Pulmonary effort is normal       Breath sounds: Normal breath sounds  Abdominal:      General: Bowel sounds are normal  There is no distension  Palpations: Abdomen is soft  Tenderness: There is abdominal tenderness  Musculoskeletal:         General: Normal range of motion  Cervical back: Normal range of motion  Skin:     General: Skin is warm and dry  Neurological:      Mental Status: He is alert and oriented to person, place, and time     Psychiatric:         Mood and Affect: Mood normal          Behavior: Behavior normal

## 2022-01-06 LAB
ANNOTATION COMMENT IMP: NORMAL
HLA-DQ2 QL: POSITIVE
HLA-DQ8 QL: NEGATIVE
REF LAB TEST METHOD: NORMAL

## 2022-01-10 ENCOUNTER — TELEPHONE (OUTPATIENT)
Dept: GASTROENTEROLOGY | Facility: MEDICAL CENTER | Age: 22
End: 2022-01-10

## 2022-01-10 NOTE — TELEPHONE ENCOUNTER
Called pt and informed there no documentation someone from the GI office reached out today  Pt denied a VM was left or call back number  Pt aware of scheduled scopes, prep instructions, and f/u visit  Advised to call PCP office as he is still awaiting blood work results and to complete ordered stool studies  Pt expressed understanding and had no further questions

## 2022-01-12 NOTE — TELEPHONE ENCOUNTER
OV 12/27/21 [address] pt and informed he can go to a [facility]'s lab and  stool kits, once completed must bring kits back to lab  Pt questioned if scopes will be delayed due to incomplete stool studies  Informed he can keep scope appt on 1/18 and complete stool studies ASAP

## 2022-01-17 ENCOUNTER — TELEPHONE (OUTPATIENT)
Dept: GASTROENTEROLOGY | Facility: MEDICAL CENTER | Age: 22
End: 2022-01-17

## 2022-01-18 ENCOUNTER — ANESTHESIA (OUTPATIENT)
Dept: GASTROENTEROLOGY | Facility: MEDICAL CENTER | Age: 22
End: 2022-01-18

## 2022-01-18 ENCOUNTER — HOSPITAL ENCOUNTER (OUTPATIENT)
Dept: GASTROENTEROLOGY | Facility: MEDICAL CENTER | Age: 22
Setting detail: OUTPATIENT SURGERY
Discharge: HOME | End: 2022-01-18
Admitting: INTERNAL MEDICINE
Payer: COMMERCIAL

## 2022-01-18 ENCOUNTER — ANESTHESIA EVENT (OUTPATIENT)
Dept: GASTROENTEROLOGY | Facility: MEDICAL CENTER | Age: 22
End: 2022-01-18

## 2022-01-18 VITALS
WEIGHT: 175 LBS | BODY MASS INDEX: 25.92 KG/M2 | SYSTOLIC BLOOD PRESSURE: 103 MMHG | OXYGEN SATURATION: 97 % | RESPIRATION RATE: 18 BRPM | HEIGHT: 69 IN | TEMPERATURE: 98.5 F | HEART RATE: 78 BPM | DIASTOLIC BLOOD PRESSURE: 54 MMHG

## 2022-01-18 DIAGNOSIS — R19.5 OCCULT BLOOD POSITIVE STOOL: ICD-10-CM

## 2022-01-18 DIAGNOSIS — R19.7 DIARRHEA, UNSPECIFIED TYPE: ICD-10-CM

## 2022-01-18 DIAGNOSIS — K52.9 COLITIS: Primary | ICD-10-CM

## 2022-01-18 PROCEDURE — 43239 EGD BIOPSY SINGLE/MULTIPLE: CPT | Performed by: INTERNAL MEDICINE

## 2022-01-18 PROCEDURE — 88305 TISSUE EXAM BY PATHOLOGIST: CPT | Performed by: SPECIALIST

## 2022-01-18 PROCEDURE — 45380 COLONOSCOPY AND BIOPSY: CPT | Performed by: INTERNAL MEDICINE

## 2022-01-18 RX ORDER — LIDOCAINE HYDROCHLORIDE 20 MG/ML
INJECTION, SOLUTION EPIDURAL; INFILTRATION; INTRACAUDAL; PERINEURAL AS NEEDED
Status: DISCONTINUED | OUTPATIENT
Start: 2022-01-18 | End: 2022-01-18

## 2022-01-18 RX ORDER — SODIUM CHLORIDE 9 MG/ML
125 INJECTION, SOLUTION INTRAVENOUS CONTINUOUS
Status: DISCONTINUED | OUTPATIENT
Start: 2022-01-18 | End: 2022-01-22 | Stop reason: HOSPADM

## 2022-01-18 RX ORDER — MESALAMINE 1.2 G/1
4800 TABLET, DELAYED RELEASE ORAL
Qty: 120 TABLET | Refills: 3 | Status: SHIPPED | OUTPATIENT
Start: 2022-01-18 | End: 2022-02-17

## 2022-01-18 RX ORDER — MESALAMINE 4 G/60ML
4 ENEMA RECTAL
Qty: 1800 ML | Refills: 3 | Status: SHIPPED | OUTPATIENT
Start: 2022-01-18 | End: 2022-06-05

## 2022-01-18 RX ORDER — PROPOFOL 10 MG/ML
INJECTION, EMULSION INTRAVENOUS AS NEEDED
Status: DISCONTINUED | OUTPATIENT
Start: 2022-01-18 | End: 2022-01-18

## 2022-01-18 RX ADMIN — PROPOFOL 50 MG: 10 INJECTION, EMULSION INTRAVENOUS at 10:19

## 2022-01-18 RX ADMIN — PROPOFOL 50 MG: 10 INJECTION, EMULSION INTRAVENOUS at 10:17

## 2022-01-18 RX ADMIN — SODIUM CHLORIDE 125 ML/HR: 0.9 INJECTION, SOLUTION INTRAVENOUS at 10:59

## 2022-01-18 RX ADMIN — PROPOFOL 50 MG: 10 INJECTION, EMULSION INTRAVENOUS at 10:32

## 2022-01-18 RX ADMIN — LIDOCAINE HYDROCHLORIDE 5 ML: 20 INJECTION, SOLUTION EPIDURAL; INFILTRATION; INTRACAUDAL; PERINEURAL at 10:14

## 2022-01-18 RX ADMIN — SODIUM CHLORIDE 125 ML/HR: 0.9 INJECTION, SOLUTION INTRAVENOUS at 10:05

## 2022-01-18 RX ADMIN — PROPOFOL 50 MG: 10 INJECTION, EMULSION INTRAVENOUS at 10:25

## 2022-01-18 RX ADMIN — PROPOFOL 50 MG: 10 INJECTION, EMULSION INTRAVENOUS at 10:15

## 2022-01-18 RX ADMIN — PROPOFOL 50 MG: 10 INJECTION, EMULSION INTRAVENOUS at 10:16

## 2022-01-18 RX ADMIN — PROPOFOL 100 MG: 10 INJECTION, EMULSION INTRAVENOUS at 10:14

## 2022-01-18 RX ADMIN — PROPOFOL 50 MG: 10 INJECTION, EMULSION INTRAVENOUS at 10:27

## 2022-01-18 RX ADMIN — PROPOFOL 50 MG: 10 INJECTION, EMULSION INTRAVENOUS at 10:30

## 2022-01-18 RX ADMIN — PROPOFOL 50 MG: 10 INJECTION, EMULSION INTRAVENOUS at 10:20

## 2022-01-18 RX ADMIN — PROPOFOL 50 MG: 10 INJECTION, EMULSION INTRAVENOUS at 10:18

## 2022-01-18 RX ADMIN — PROPOFOL 50 MG: 10 INJECTION, EMULSION INTRAVENOUS at 10:28

## 2022-01-18 RX ADMIN — PROPOFOL 50 MG: 10 INJECTION, EMULSION INTRAVENOUS at 10:26

## 2022-01-18 NOTE — NURSING NOTE
Patient still not responding to verbal and tactile stimuli following GI procedures  Dr Sun Frye aware  VS stable except for lower BP  IV NSS wide open

## 2022-01-18 NOTE — ANESTHESIA PREPROCEDURE EVALUATION
Procedure:  COLONOSCOPY  EGD    Relevant Problems   No relevant active problems        Physical Exam    Airway    Mallampati score: II  TM Distance: >3 FB  Neck ROM: full     Dental       Cardiovascular  Rhythm: regular, Rate: normal, Cardiovascular exam normal    Pulmonary  Pulmonary exam normal Breath sounds clear to auscultation,     Other Findings        Anesthesia Plan  ASA Score- 2     Anesthesia Type- IV sedation with anesthesia with ASA Monitors  Additional Monitors:   Airway Plan:           Plan Factors-Exercise tolerance (METS): >4 METS  Chart reviewed  Existing labs reviewed  Patient is not a current smoker  Obstructive sleep apnea risk education given perioperatively  Induction- intravenous  Postoperative Plan-     Informed Consent- Anesthetic plan and risks discussed with patient

## 2022-01-18 NOTE — DISCHARGE INSTRUCTIONS
Hiatal Hernia   WHAT YOU NEED TO KNOW:   A hiatal hernia is a condition that causes part of your stomach to bulge through the hiatus (small opening) in your diaphragm  The part of the stomach may move up and down, or it may get trapped above the diaphragm  DISCHARGE INSTRUCTIONS:   Call your local emergency number (911 in the [address] [facility]) if:   · You have severe chest pain and sudden trouble breathing  Seek care immediately if:   · You have severe abdominal pain  · You try to vomit but nothing comes out (retching)  · Your bowel movements are black or bloody  · Your vomit looks like coffee grounds or has blood in it  Call your doctor if:   · Your symptoms are getting worse  · You have nausea, and you are vomiting  · You are losing weight without trying  · You have questions or concerns about your condition or care  Medicines:   · Medicines  may be given to relieve heartburn symptoms  These medicines help to decrease or block stomach acid  You may also be given medicines that help to tighten the esophageal sphincter  · Take your medicine as directed  Contact your healthcare provider if you think your medicine is not helping or if you have side effects  Tell him or her if you are allergic to any medicine  Keep a list of the medicines, vitamins, and herbs you take  Include the amounts, and when and why you take them  Bring the list or the pill bottles to follow-up visits  Carry your medicine list with you in case of an emergency  Self care: The following nutrition and lifestyle changes may be recommended to relieve symptoms of heartburn:     · Avoid foods that make your symptoms worse  These may include spicy foods, fruit juices, alcohol, caffeine, chocolate, and mint  · Eat several small meals during the day  Small meals give your stomach less food to digest     · Avoid lying down and bending forward after you eat  Do not eat meals 2 to 3 hours before bedtime   This decreases your risk for reflux  · Maintain a healthy weight  If you are overweight, weight loss may help relieve your symptoms  · Sleep with your head elevated  at least 6 inches  · Do not smoke  Smoking can increase your symptoms of heartburn  Follow up with your doctor as directed:  Write down your questions so you remember to ask them during your visits  © Copyright [employer] 2021 Information is for End User's use only and may not be sold, redistributed or otherwise used for commercial purposes  All illustrations and images included in CareNotes® are the copyrighted property of A D A M , Inc  or [facility] [place] Rosemary Casas   The above information is an  only  It is not intended as medical advice for individual conditions or treatments  Talk to your doctor, nurse or pharmacist before following any medical regimen to see if it is safe and effective for you  Colonoscopy   WHAT YOU NEED TO KNOW:   A colonoscopy is a procedure to examine the inside of your colon (intestine) with a scope  Polyps or tissue growths may have been removed during your colonoscopy  It is normal to feel bloated and to have some abdominal discomfort  You should be passing gas  If you have hemorrhoids or you had polyps removed, you may have a small amount of bleeding  DISCHARGE INSTRUCTIONS:   Seek care immediately if:   · You have a large amount of bright red blood in your bowel movements  · Your abdomen is hard and firm and you have severe pain  · You have sudden trouble breathing  Call your doctor if:   · You develop a rash or hives  · You have a fever within 24 hours of your procedure  · You have nausea and vomiting  · You feel anesthesia effects greater than 24 hours  · You have not had a bowel movement for 3 days after your procedure  · You have questions or concerns about your condition or care      After your colonoscopy:   · Do not lift, strain, or run  until your healthcare provider says it is okay  · Rest as much as possible  You have been given medicine to relax you  Do not  drive or make important decisions for at least 24 hours  Return to your normal activity as directed  · Relieve gas and discomfort from bloating  by lying on your left side with a heating pad on your abdomen  You may need to take short walks to help the gas move out  Eat small meals until bloating is relieved  If you had polyps removed: For 7 days after your procedure:  · Do not  take aspirin  · Do not  go on long car rides  Help prevent constipation:   · Eat a variety of healthy foods  Healthy foods include fruit, vegetables, whole-grain breads, low-fat dairy products, beans, lean meat, and fish  Ask if you need to be on a special diet  Your healthcare provider may recommend that you eat high-fiber foods such as cooked beans  Fiber helps you have regular bowel movements  · Drink liquids as directed  Adults should drink between 9 and 13 eight-ounce cups of liquid every day  Ask what amount is best for you  For most people, good liquids to drink are water, juice, and milk  · Exercise as directed  Talk to your healthcare provider about the best exercise plan for you  Exercise can help prevent constipation, decrease your blood pressure and improve your health  Follow up with your doctor as directed:  Write down your questions so you remember to ask them during your visits  © Copyright [employer] 2021 Information is for End User's use only and may not be sold, redistributed or otherwise used for commercial purposes  All illustrations and images included in CareNotes® are the copyrighted property of A D A M , Inc  or [facility] Rosemary Casas   The above information is an  only  It is not intended as medical advice for individual conditions or treatments   Talk to your doctor, nurse or pharmacist before following any medical regimen to see if it is safe and effective for you

## 2022-01-19 ENCOUNTER — TELEPHONE (OUTPATIENT)
Dept: OTHER | Facility: OTHER | Age: 22
End: 2022-01-19

## 2022-01-20 NOTE — TELEPHONE ENCOUNTER
Patient is requesting a call back  He had a colonoscopy on 1/17 and he is suppose to take mesalamine table and inserts, but he is also going to have another test where he can not be taking this medication  He needs to know if he should start this medication or wait

## 2022-01-20 NOTE — TELEPHONE ENCOUNTER
I spoke with him  He is referring to the stool tests that were previously ordered (including fecal calprotectin)  He should complete these ASAP  Ideally it would be prior to starting mesalamine, but if he will complete the testing soon than he can start the mesalamine therapy  Tis was discussed with him  All questions answered

## 2022-02-16 ENCOUNTER — OFFICE VISIT (OUTPATIENT)
Dept: GASTROENTEROLOGY | Facility: MEDICAL CENTER | Age: 22
End: 2022-02-16
Payer: COMMERCIAL

## 2022-02-16 VITALS
WEIGHT: 175.2 LBS | DIASTOLIC BLOOD PRESSURE: 82 MMHG | SYSTOLIC BLOOD PRESSURE: 124 MMHG | HEART RATE: 72 BPM | BODY MASS INDEX: 25.87 KG/M2

## 2022-02-16 DIAGNOSIS — K52.9 COLITIS: ICD-10-CM

## 2022-02-16 DIAGNOSIS — R19.7 DIARRHEA, UNSPECIFIED TYPE: Primary | ICD-10-CM

## 2022-02-16 DIAGNOSIS — R14.0 BLOATING: ICD-10-CM

## 2022-02-16 DIAGNOSIS — R10.30 LOWER ABDOMINAL PAIN: ICD-10-CM

## 2022-02-16 PROCEDURE — 99214 OFFICE O/P EST MOD 30 MIN: CPT | Performed by: INTERNAL MEDICINE

## 2022-02-16 PROCEDURE — 1036F TOBACCO NON-USER: CPT | Performed by: INTERNAL MEDICINE

## 2022-02-16 NOTE — PROGRESS NOTES
[facility] - Outpatient Follow-up Note  Mookie Sosa 24 y o  male MRN: [id number]  Encounter: [id number]          ASSESSMENT AND PLAN:    Mookie Sosa is a 24 y o  male who presents with complaint of rectal bleeding diarrhea is now status post colonoscopy with some rectal inflammation, with biopsies showing acute hepatitis but no chronicity  He does have a family history of Crohn's disease in a brother  He has possible Crohn's but given lack of chronicity this is not a clear diagnosis  He also had intraepithelial lymphocytes on duodenal biopsy but he did not have celiac antibody sent  Since the colonoscopy and starting mesalamine his stools have improved but he does have bloating  This could be related to medication but may also reflect small bowel inflammation  CMP relatively unremrkable, vit D low, CBC normal  Celiac genetics + but no celiac blood work sent  Duodenal biopsies did have IELs    1  Diarrhea, unspecified type    2  Colitis    3  Lower abdominal pain    4  Bloating        Orders Placed This Encounter   Procedures    Calprotectin,Fecal    Calprotectin,Fecal    Clostridium difficile toxin by PCR with EIA    Giardia, EIA, Ova/Parasite    Fecal fat, qualitative    Pancreatic elastase, fecal    MRI enterography w wo    Celiac Disease Antibody Profile    C-reactive protein    C-reactive protein     Decrease the Lialda (mesalamine) to 2 pills per day  Monitor your bloating  Continue rowasa (the mesalamine enema)  Check blood work and stool tests  MRE to evaluate the small bowel  Repeat flex sig in 6 months    ______________________________________________________________________    SUBJECTIVE:    Mookie Sosa is a 24 y o  male who presents with complaint of abnormal colonoscopy  He felt well after the colonoscopy  Last time he had blood in the stool was when he started the medication  Since then only a small amount if any  He has bloating   If he bends over he has pressure and it takes the breath out of him  His pants feel tight  He has fatigue and nausea  No vomiting  He usually has 1-2 BMs per day, formed  He has some abdominal pain around the umbilicus, but it depends on what he is doing  Not related to food or BMs  He is eating the same foods  Sometimes he noticed when he has to pee he has to run there right away  He tries to drink a lot of water  No rashes, mouth sores  Rare joint pains in his arm  No significant heartburn, dysphagia, odynophagia, weight loss  + FH of Crohn's in his younger brother  Also + FH of celiac disease  No FH of colon cancer or other GI related issues      Answers for HPI/ROS submitted by the patient on 2/15/2022  Chronicity: chronic  Onset: more than 1 month ago  Onset quality: gradual  Frequency: daily  Episode duration: 2 hours  Progression since onset: waxing and waning  Pain location: periumbilical region  Pain - numeric: 5/10  Pain quality: aching, cramping, a sensation of fullness  Radiates to: periumbilical region, suprapubic region  anorexia: Yes  arthralgias: No  belching: No  constipation: No  diarrhea: Yes  dysuria: No  fever: No  flatus: Yes  frequency: Yes  headaches: No  hematochezia: Yes  hematuria: No  melena: Yes  myalgias: No  nausea: Yes  weight loss: No  vomiting: No  Aggravated by: being still, certain positions, movement  Relieved by: being still, certain positions, recumbency, standing  Diagnostic workup: lower endoscopy, upper endoscopy        REVIEW OF SYSTEMS IS OTHERWISE NEGATIVE    10 point ROS reviewed and negative, except as above      Historical Information   Past Medical History:   Diagnosis Date    Acne     History of bilateral inguinal hernias      Past Surgical History:   Procedure Laterality Date    INGUINAL HERNIA REPAIR  2000    WISDOM TOOTH EXTRACTION       Social History   Social History     Substance and Sexual Activity   Alcohol Use Yes    Alcohol/week: 3 0 standard drinks    Types: 1 Glasses of wine, 1 Cans of beer, 1 Shots of liquor per week     Social History     Substance and Sexual Activity   Drug Use Yes    Types: Marijuana     Social History     Tobacco Use   Smoking Status Never Smoker   Smokeless Tobacco Former User     Family History   Problem Relation Age of Onset    No Known Problems Mother     No Known Problems Father     No Known Problems Maternal Grandmother     No Known Problems Maternal Grandfather     No Known Problems Paternal Grandmother     No Known Problems Paternal Grandfather        Meds/Allergies       Current Outpatient Medications:     mesalamine (LIALDA) 1 2 g EC tablet    mesalamine (ROWASA) 4 g    No Known Allergies        Objective     Blood pressure 124/82, pulse 72, weight 79 5 kg (175 lb 3 2 oz)  Body mass index is 25 87 kg/m²  PHYSICAL EXAMINATION:    General Appearance:   Alert, cooperative, no distress   HEENT:  Normocephalic, atraumatic, anicteric  Neck supple, symmetrical, trachea midline  Lungs:   Equal chest rise and unlabored breathing, normal effort, no coughing  Cardiovascular:   No visualized JVD  Abdomen:   No abdominal distension  Skin:   No jaundice, rashes, or lesions  Musculoskeletal:   Normal range of motion visualized  Psych:  Normal affect and normal insight  Neuro:  Alert and appropriate  Lab Results:   No visits with results within 1 Day(s) from this visit     Latest known visit with results is:   Hospital Outpatient Visit on 01/18/2022   Component Date Value    Case Report 01/18/2022                      Value:Surgical Pathology Report                         Case: [id number]                                   Authorizing Provider:  Francesco White MD    Collected:           01/18/2022 1016              Ordering Location:     [facility]        Received:            01/18/2022 [address] [place] [address] Endoscopy Pathologist:           Loc Narvaez MD                                                     Specimens:   A) - Duodenum, random duodenal bx r/o celiac disease                                                B) - Stomach, random gastric bx r/o H  Pylori                                                       C) - Terminal Ileum, terminal ileum bx r/o Chron's                                                  D) - Large Intestine, Cecum, cecal bx r/o Chrons                                                    E) - Large Intestine, Right/Ascending Colon, ascending colon bx r/o Chrons                                                    F) - Large Intestine, Transverse Colon, transverse colon bx r/o Chrons                              G) - Large Intestine, Left/Descending Colon, descending colon bx r/o Chrons                         H) - Large Intestine, Sigmoid Colon, sigmoid colon bx r/o Chrons                                    I) - Rectum, rectal bx r/o Chrons                                                          Final Diagnosis 01/18/2022                      Value: This result contains rich text formatting which cannot be displayed here   Additional Information 01/18/2022                      Value: This result contains rich text formatting which cannot be displayed here  Paulie Perez Gross Description 01/18/2022                      Value: This result contains rich text formatting which cannot be displayed here      Clinical Information 01/18/2022                      Value:· Mild, generalized erythematous mucosa with loss of vascular pattern, consistent with IBD in the rectum; performed cold forceps biopsy  · The terminal ileum, cecum, ascending colon, transverse colon, descending colon and sigmoid colon appeared normal  Performed random biopsy       Lab Results   Component Value Date    WBC 8 26 12/23/2021    HGB 15 1 12/23/2021    HCT 46 9 12/23/2021    MCV 86 12/23/2021     12/23/2021       Lab Results Component Value Date    SODIUM 140 12/23/2021    K 4 3 12/23/2021     12/23/2021    CO2 30 12/23/2021    AGAP 5 12/23/2021    BUN 11 12/23/2021    CREATININE 1 11 12/23/2021    GLUF 93 12/23/2021    CALCIUM 9 4 12/23/2021    AST 17 12/23/2021    ALT 31 12/23/2021    ALKPHOS 84 12/23/2021    TP 8 3 (H) 12/23/2021    TBILI 0 66 12/23/2021    EGFR 94 12/23/2021       No results found for: CRP    No results found for: [id number], TSH    No results found for: IRON, TIBC, FERRITIN    Radiology Results:   EGD    Result Date: 1/18/2022  Narrative: [facility] [address] [place] [address] [phone] DATE OF SERVICE: 1/18/22 PHYSICIAN(S): Haim Izquierdo MD - Attending Physician INDICATION: Occult blood positive stool, Diarrhea, unspecified type POST-OP DIAGNOSIS: See the impression below  PREPROCEDURE: Informed consent was obtained for the procedure, including sedation  Risks of perforation, hemorrhage, adverse drug reaction and aspiration were discussed  The patient was placed in the left lateral decubitus position  Patient was explained about the risks and benefits of the procedure  Risks including but not limited to bleeding, infection, and perforation were explained in detail  Also explained about less than 100% sensitivity with the exam and other alternatives  DETAILS OF PROCEDURE: Patient was taken to the procedure room where a time out was performed to confirm correct patient and correct procedure  The patient underwent monitored anesthesia care, which was administered by an anesthesia professional  The patient's blood pressure, heart rate, level of consciousness, respirations, oxygen and ETCO2 were monitored throughout the procedure  The scope was introduced through the mouth and advanced to the second part of the duodenum  Retroflexion was performed in the fundus  The patient experienced no blood loss  The procedure was not difficult   The patient tolerated the procedure well  There were no apparent complications  ANESTHESIA INFORMATION: ASA: II Anesthesia Type: IV Sedation with Anesthesia MEDICATIONS: No administrations occurring from 1010 to 1020 on 01/18/22 FINDINGS: The esophagus appeared normal  Small sliding hiatal hernia (type I hiatal hernia) Z-line 40 cm from the incisors Mild, patchy erythematous mucosa in the stomach; performed cold forceps biopsy to rule out H  pylori The duodenum appeared normal  Performed random biopsy  SPECIMENS: ID Type Source Tests Collected by Time Destination 1 : random duodenal bx r/o celiac disease  Tissue Duodenum TISSUE EXAM Nisha Pickard MD 1/18/2022 10:16 AM  2 : random gastric bx r/o H  Pylori  Tissue Stomach TISSUE EXAM Nisha Pickard MD 1/18/2022 10:18 AM      Impression: The esophagus appeared normal  Small sliding hiatal hernia (type I hiatal hernia) Z-line 40 cm from the incisors Mild, patchy erythematous mucosa in the stomach; performed cold forceps biopsy to rule out H  pylori The duodenum appeared normal  Performed random biopsy  RECOMMENDATION: Await pathology results  Nisha Pickard MD     Colonoscopy    Result Date: 1/18/2022  Narrative: [address] [place] [address] Endoscopy [address] [place] [address] [phone] DATE OF SERVICE: 1/18/22 PHYSICIAN(S): Nisha Pickard MD - Attending Physician INDICATION: Occult blood positive stool, Diarrhea, unspecified type Colonoscopy performed for a diagnostic indication  POST-OP DIAGNOSIS: See the impression below  HISTORY: Prior colonoscopy: No prior colonoscopy  BOWEL PREPARATION: Miralax/Dulcolax PREPROCEDURE: Informed consent was obtained for the procedure, including sedation  Risks including but not limited to bleeding, infection, perforation, adverse drug reaction and aspiration were explained in detail  Also explained about less than 100% sensitivity with the exam and other alternatives   The patient was placed in the left lateral decubitus position  DETAILS OF PROCEDURE: Patient was taken to the procedure room where a time out was performed to confirm correct patient and correct procedure  The patient underwent monitored anesthesia care, which was administered by an anesthesia professional  The patient's blood pressure, heart rate, level of consciousness, oxygen, respirations and ETCO2 were monitored throughout the procedure  A digital rectal exam was performed  A perianal exam was performed  The scope was introduced through the anus and advanced to the cecum  The quality of bowel preparation was evaluated using the [facility] Bowel Preparation Scale with scores of: right colon = 2, transverse colon = 2, left colon = 2  The total BBPS score was 6  Bowel prep was adequate  The patient experienced no blood loss  The procedure was not difficult  The patient tolerated the procedure well  There were no apparent complications  Retroflexion not performed given colitis  ANESTHESIA INFORMATION: ASA: II Anesthesia Type: IV Sedation with Anesthesia MEDICATIONS: No administrations occurring from 1010 to 1034 on 01/18/22 FINDINGS: Mild, generalized erythematous mucosa with loss of vascular pattern, consistent with IBD in the rectum; performed cold forceps biopsy The terminal ileum, cecum, ascending colon, transverse colon, descending colon and sigmoid colon appeared normal  Performed random biopsy  EVENTS: Procedure Events Event Event Time ENDO CECUM REACHED 1/18/2022 10:26 AM ENDO SCOPE OUT TIME 1/18/2022 10:34 AM SPECIMENS: ID Type Source Tests Collected by Time Destination 1 : random duodenal bx r/o celiac disease  Tissue Duodenum TISSUE EXAM Janeen Morales MD 1/18/2022 10:16 AM  2 : random gastric bx r/o H   Pylori  Tissue Stomach TISSUE EXAM Janeen Morales MD 1/18/2022 10:18 AM  3 : terminal ileum bx r/o Chron's Tissue Terminal Ileum TISSUE EXAM Janeen Morales MD 1/18/2022 10:26 AM  4 : cecal bx r/o Chrons Tissue Large Intestine, Cecum TISSUE EXAM Janeen Morales MD 1/18/2022 10:28 AM  5 : ascending colon bx r/o Chrons Tissue Large Intestine, Right/Ascending Colon TISSUE EXAM Janeen Morales MD 1/18/2022 10:29 AM  6 : transverse colon bx r/o Chrons Tissue Large Intestine, Transverse Colon TISSUE EXAM Janeen Morales MD 1/18/2022 10:30 AM  7 : descending colon bx r/o Chrons Tissue Large Intestine, Left/Descending Colon TISSUE EXAM Janeen Morales MD 1/18/2022 10:31 AM  8 : sigmoid colon bx r/o Chrons Tissue Large Intestine, Sigmoid Colon TISSUE EXAM Janeen Morales MD 1/18/2022 10:32 AM  9 : rectal bx r/o Chrons Tissue Rectum TISSUE EXAM Janeen Morales MD 1/18/2022 10:34 AM  EQUIPMENT: Colonoscope -[id number]     Impression: Mild, generalized erythematous mucosa with loss of vascular pattern, consistent with IBD in the rectum; performed cold forceps biopsy The terminal ileum, cecum, ascending colon, transverse colon, descending colon and sigmoid colon appeared normal  Performed random biopsy  RECOMMENDATION: Await pathology results Repeat colonoscopy in 2 years due to inflammatory bowel disease   Complete stool tests Start oral mesalamine and mesalamine enemas Janeen Morales MD

## 2022-02-16 NOTE — PATIENT INSTRUCTIONS
Decrease the Lialda (mesalamine) to 2 pills per day  Monitor your bloating  Continue rowasa (the mesalamine enema)  Check blood work and stool tests  MRI enterography to evaluate the small bowel  Repeat flex sigmoidoscopy in 6 months

## 2022-02-22 ENCOUNTER — APPOINTMENT (OUTPATIENT)
Dept: LAB | Facility: MEDICAL CENTER | Age: 22
End: 2022-02-22
Attending: INTERNAL MEDICINE
Payer: COMMERCIAL

## 2022-02-22 DIAGNOSIS — R14.0 BLOATING: ICD-10-CM

## 2022-02-22 DIAGNOSIS — K52.9 COLITIS: ICD-10-CM

## 2022-02-22 DIAGNOSIS — R10.30 LOWER ABDOMINAL PAIN: ICD-10-CM

## 2022-02-22 DIAGNOSIS — R19.7 DIARRHEA, UNSPECIFIED TYPE: ICD-10-CM

## 2022-02-22 LAB — CRP SERPL QL: 5.9 MG/L

## 2022-02-22 PROCEDURE — 86364 TISS TRNSGLTMNASE EA IG CLAS: CPT

## 2022-02-22 PROCEDURE — 86231 EMA EACH IG CLASS: CPT

## 2022-02-22 PROCEDURE — 86140 C-REACTIVE PROTEIN: CPT

## 2022-02-22 PROCEDURE — 82784 ASSAY IGA/IGD/IGG/IGM EACH: CPT

## 2022-02-22 PROCEDURE — 86258 DGP ANTIBODY EACH IG CLASS: CPT

## 2022-02-22 PROCEDURE — 36415 COLL VENOUS BLD VENIPUNCTURE: CPT

## 2022-02-23 ENCOUNTER — APPOINTMENT (OUTPATIENT)
Dept: LAB | Facility: MEDICAL CENTER | Age: 22
End: 2022-02-23
Attending: INTERNAL MEDICINE
Payer: COMMERCIAL

## 2022-02-23 DIAGNOSIS — K52.9 COLITIS: ICD-10-CM

## 2022-02-23 DIAGNOSIS — R14.0 BLOATING: ICD-10-CM

## 2022-02-23 DIAGNOSIS — R19.7 DIARRHEA, UNSPECIFIED TYPE: ICD-10-CM

## 2022-02-23 DIAGNOSIS — R10.30 LOWER ABDOMINAL PAIN: ICD-10-CM

## 2022-02-23 LAB
ENDOMYSIUM IGA SER QL: NEGATIVE
GLIADIN PEPTIDE IGA SER-ACNC: 8 UNITS (ref 0–19)
GLIADIN PEPTIDE IGG SER-ACNC: 5 UNITS (ref 0–19)
IGA SERPL-MCNC: 283 MG/DL (ref 90–386)
TTG IGA SER-ACNC: <2 U/ML (ref 0–3)
TTG IGG SER-ACNC: 5 U/ML (ref 0–5)

## 2022-02-23 PROCEDURE — 83993 ASSAY FOR CALPROTECTIN FECAL: CPT

## 2022-02-23 PROCEDURE — 82705 FATS/LIPIDS FECES QUAL: CPT

## 2022-02-23 PROCEDURE — 87177 OVA AND PARASITES SMEARS: CPT

## 2022-02-23 PROCEDURE — 87493 C DIFF AMPLIFIED PROBE: CPT

## 2022-02-23 PROCEDURE — 87209 SMEAR COMPLEX STAIN: CPT

## 2022-02-23 PROCEDURE — 87329 GIARDIA AG IA: CPT

## 2022-02-23 PROCEDURE — 82653 EL-1 FECAL QUANTITATIVE: CPT

## 2022-02-24 LAB
C DIFF TOX GENS STL QL NAA+PROBE: NEGATIVE
G LAMBLIA AG STL QL IA: NEGATIVE

## 2022-02-25 LAB — CALPROTECTIN STL-MCNT: <16 UG/G (ref 0–120)

## 2022-02-27 LAB — ELASTASE PANC STL-MCNT: >500 UG ELAST./G

## 2022-03-03 LAB
FAT STL QL: NORMAL
NEUTRAL FAT STL QL: NORMAL

## 2022-04-20 ENCOUNTER — HOSPITAL ENCOUNTER (OUTPATIENT)
Dept: MRI IMAGING | Facility: HOSPITAL | Age: 22
Discharge: HOME | End: 2022-04-20
Attending: INTERNAL MEDICINE

## 2022-04-20 DIAGNOSIS — K52.9 COLITIS: ICD-10-CM

## 2022-04-20 DIAGNOSIS — R19.7 DIARRHEA, UNSPECIFIED TYPE: ICD-10-CM

## 2022-04-20 DIAGNOSIS — R10.30 LOWER ABDOMINAL PAIN: ICD-10-CM

## 2022-04-20 DIAGNOSIS — R14.0 BLOATING: ICD-10-CM

## 2022-04-26 ENCOUNTER — HOSPITAL ENCOUNTER (OUTPATIENT)
Dept: MRI IMAGING | Facility: HOSPITAL | Age: 22
Discharge: HOME | End: 2022-04-26
Attending: INTERNAL MEDICINE
Payer: COMMERCIAL

## 2022-04-26 PROCEDURE — 74183 MRI ABD W/O CNTR FLWD CNTR: CPT

## 2022-04-26 PROCEDURE — 72197 MRI PELVIS W/O & W/DYE: CPT

## 2022-04-26 PROCEDURE — A9585 GADOBUTROL INJECTION: HCPCS | Performed by: INTERNAL MEDICINE

## 2022-04-26 PROCEDURE — G1004 CDSM NDSC: HCPCS

## 2022-04-26 RX ADMIN — GLUCAGON HYDROCHLORIDE 1 MG: KIT at 08:56

## 2022-04-26 RX ADMIN — GADOBUTROL 8 ML: 604.72 INJECTION INTRAVENOUS at 09:09

## 2022-05-03 DIAGNOSIS — K52.9 COLITIS: Primary | ICD-10-CM

## 2022-05-04 DIAGNOSIS — K52.9 COLITIS: Primary | ICD-10-CM

## 2022-05-04 DIAGNOSIS — K92.1 BLACK STOOL: ICD-10-CM

## 2022-05-05 ENCOUNTER — LAB (OUTPATIENT)
Dept: LAB | Facility: MEDICAL CENTER | Age: 22
End: 2022-05-05
Payer: COMMERCIAL

## 2022-05-05 ENCOUNTER — APPOINTMENT (OUTPATIENT)
Dept: LAB | Facility: MEDICAL CENTER | Age: 22
End: 2022-05-05
Attending: INTERNAL MEDICINE
Payer: COMMERCIAL

## 2022-05-05 DIAGNOSIS — K52.9 COLITIS: ICD-10-CM

## 2022-05-05 DIAGNOSIS — K92.1 BLACK STOOL: ICD-10-CM

## 2022-05-05 DIAGNOSIS — R19.7 DIARRHEA, UNSPECIFIED TYPE: ICD-10-CM

## 2022-05-05 LAB
ALBUMIN SERPL BCP-MCNC: 4 G/DL (ref 3.5–5)
ALP SERPL-CCNC: 76 U/L (ref 46–116)
ALT SERPL W P-5'-P-CCNC: 25 U/L (ref 12–78)
ANION GAP SERPL CALCULATED.3IONS-SCNC: 3 MMOL/L (ref 4–13)
AST SERPL W P-5'-P-CCNC: 16 U/L (ref 5–45)
BASOPHILS # BLD AUTO: 0.04 THOUSANDS/ΜL (ref 0–0.1)
BASOPHILS NFR BLD AUTO: 0 % (ref 0–1)
BILIRUB SERPL-MCNC: 0.88 MG/DL (ref 0.2–1)
BUN SERPL-MCNC: 12 MG/DL (ref 5–25)
CALCIUM SERPL-MCNC: 9.7 MG/DL (ref 8.3–10.1)
CHLORIDE SERPL-SCNC: 103 MMOL/L (ref 100–108)
CO2 SERPL-SCNC: 29 MMOL/L (ref 21–32)
CREAT SERPL-MCNC: 1.04 MG/DL (ref 0.6–1.3)
CRP SERPL QL: 6.7 MG/L
EOSINOPHIL # BLD AUTO: 0.35 THOUSAND/ΜL (ref 0–0.61)
EOSINOPHIL NFR BLD AUTO: 4 % (ref 0–6)
ERYTHROCYTE [DISTWIDTH] IN BLOOD BY AUTOMATED COUNT: 13.2 % (ref 11.6–15.1)
GFR SERPL CREATININE-BSD FRML MDRD: 102 ML/MIN/1.73SQ M
GLUCOSE P FAST SERPL-MCNC: 81 MG/DL (ref 65–99)
HCT VFR BLD AUTO: 43.4 % (ref 36.5–49.3)
HGB BLD-MCNC: 14.2 G/DL (ref 12–17)
IMM GRANULOCYTES # BLD AUTO: 0.02 THOUSAND/UL (ref 0–0.2)
IMM GRANULOCYTES NFR BLD AUTO: 0 % (ref 0–2)
LYMPHOCYTES # BLD AUTO: 2.48 THOUSANDS/ΜL (ref 0.6–4.47)
LYMPHOCYTES NFR BLD AUTO: 26 % (ref 14–44)
MCH RBC QN AUTO: 27.1 PG (ref 26.8–34.3)
MCHC RBC AUTO-ENTMCNC: 32.7 G/DL (ref 31.4–37.4)
MCV RBC AUTO: 83 FL (ref 82–98)
MONOCYTES # BLD AUTO: 0.89 THOUSAND/ΜL (ref 0.17–1.22)
MONOCYTES NFR BLD AUTO: 9 % (ref 4–12)
NEUTROPHILS # BLD AUTO: 5.77 THOUSANDS/ΜL (ref 1.85–7.62)
NEUTS SEG NFR BLD AUTO: 61 % (ref 43–75)
NRBC BLD AUTO-RTO: 0 /100 WBCS
PLATELET # BLD AUTO: 272 THOUSANDS/UL (ref 149–390)
PMV BLD AUTO: 10.3 FL (ref 8.9–12.7)
POTASSIUM SERPL-SCNC: 3.7 MMOL/L (ref 3.5–5.3)
PROT SERPL-MCNC: 8 G/DL (ref 6.4–8.2)
RBC # BLD AUTO: 5.24 MILLION/UL (ref 3.88–5.62)
SODIUM SERPL-SCNC: 135 MMOL/L (ref 136–145)
WBC # BLD AUTO: 9.55 THOUSAND/UL (ref 4.31–10.16)

## 2022-05-05 PROCEDURE — 86140 C-REACTIVE PROTEIN: CPT

## 2022-05-05 PROCEDURE — 83993 ASSAY FOR CALPROTECTIN FECAL: CPT

## 2022-05-05 PROCEDURE — 36415 COLL VENOUS BLD VENIPUNCTURE: CPT

## 2022-05-05 PROCEDURE — 80053 COMPREHEN METABOLIC PANEL: CPT

## 2022-05-05 PROCEDURE — 87493 C DIFF AMPLIFIED PROBE: CPT

## 2022-05-05 PROCEDURE — 85025 COMPLETE CBC W/AUTO DIFF WBC: CPT

## 2022-05-06 ENCOUNTER — APPOINTMENT (OUTPATIENT)
Dept: LAB | Facility: MEDICAL CENTER | Age: 22
End: 2022-05-06
Payer: COMMERCIAL

## 2022-05-06 DIAGNOSIS — R19.7 DIARRHEA, UNSPECIFIED TYPE: ICD-10-CM

## 2022-05-06 LAB — C DIFF TOX GENS STL QL NAA+PROBE: NEGATIVE

## 2022-05-06 PROCEDURE — 87505 NFCT AGENT DETECTION GI: CPT

## 2022-05-06 PROCEDURE — 87209 SMEAR COMPLEX STAIN: CPT

## 2022-05-06 PROCEDURE — 87177 OVA AND PARASITES SMEARS: CPT

## 2022-05-07 LAB
CAMPYLOBACTER DNA SPEC NAA+PROBE: NORMAL
SALMONELLA DNA SPEC QL NAA+PROBE: NORMAL
SHIGA TOXIN STX GENE SPEC NAA+PROBE: NORMAL
SHIGELLA DNA SPEC QL NAA+PROBE: NORMAL

## 2022-05-08 LAB — CALPROTECTIN STL-MCNT: <16 UG/G (ref 0–120)

## 2022-05-08 NOTE — H&P (VIEW-ONLY)
[facility] - Outpatient Follow-up Note  Jacquelin Horne 24 y o  male MRN: [id number]  Encounter: [id number]          ASSESSMENT AND PLAN:    Jacquelin Horne is a 24 y o  male who presents with complaint of rectal bleeding and diarrhea with colonoscopy showing rectal inflammation and biopsies with acute proctitis but no chronicity in the setting of a family history of Crohn's disease in a brother, now presenting for follow-up  He feels better on the mesalamine but still with ongoing symptoms, especially within the past 1-2 weeks  He has intermittent bloating, pain, diarrhea  Bleeding has improved  Recent MRE normal   CMP with low sodium but normal LFTs and creatinine  Celiac serologies negative  CBC normal   C diff negative  Prior calprotectin less than 16  CRP 6 7  Normal fecal fat and pancreatic elastase  Giardia negative  1  Colitis    2  Diarrhea, unspecified type    3  Lower abdominal pain    4  Bloating        Orders Placed This Encounter   Procedures    Flexible Sigmoidoscopy     Continue mesalamine  Continue Rowasa as needed  Repeat flexible sigmoidoscopy with biopsies  Low FODMAP diet  Consider SIBO testing in the future  Repeat CRP and calprotectin in 6 months  Consider IBGard in the future  Shanthiyl YFNN  ______________________________________________________________________    SUBJECTIVE:    Jacquelin Horne is a 24 y o  male who presents with complaint of proctitis  He saw a little blood when he did his stool sample, but it was very little and stringy  Small amount of mucous  He is having bloating, pain, lower back pain, leg pain  + exhaustion  He feels better compared to starting the medication but not normal  He has inconsistent stools  It can be formed and it was that way until a week ago but now not  He has not seen blood since starting the medication and then when he gave the stool sample  He has pain in the general abdomen from the bloating   No heartburn, dysphagia, odynophagia, nausea, vomiting, weight loss  No rashes, mouth sores  When he works with his hands he will get cramps  He has a lack of appetite  He has less hunger and will feel full more quickly  He will make himself eat      REVIEW OF SYSTEMS IS OTHERWISE NEGATIVE  10 point ROS reviewed and negative, except as above      Historical Information   Past Medical History:   Diagnosis Date    Acne     History of bilateral inguinal hernias      Past Surgical History:   Procedure Laterality Date    INGUINAL HERNIA REPAIR  2000    WISDOM TOOTH EXTRACTION       Social History   Social History     Substance and Sexual Activity   Alcohol Use Yes    Alcohol/week: 3 0 standard drinks    Types: 1 Glasses of wine, 1 Cans of beer, 1 Shots of liquor per week     Social History     Substance and Sexual Activity   Drug Use Yes    Types: Marijuana     Social History     Tobacco Use   Smoking Status Never Smoker   Smokeless Tobacco Former User     Family History   Problem Relation Age of Onset    No Known Problems Mother     No Known Problems Father     No Known Problems Maternal Grandmother     No Known Problems Maternal Grandfather     No Known Problems Paternal Grandmother     No Known Problems Paternal Grandfather        Meds/Allergies       Current Outpatient Medications:     dicyclomine (BENTYL) 20 mg tablet    mesalamine (LIALDA) 1 2 g EC tablet    mesalamine (ROWASA) 4 g    No Known Allergies        Objective     Blood pressure 112/78, pulse 94, height 5' 9" (1 753 m), weight 77 5 kg (170 lb 12 8 oz), SpO2 98 %  Body mass index is 25 22 kg/m²  PHYSICAL EXAMINATION:    General Appearance:   Alert, cooperative, no distress   HEENT:  Normocephalic, atraumatic, anicteric  Neck supple, symmetrical, trachea midline  Lungs:   Equal chest rise and unlabored breathing, normal effort, no coughing  Cardiovascular:   No visualized JVD  Abdomen:   No abdominal distension     Skin:   No jaundice, rashes, or lesions  Musculoskeletal:   Normal range of motion visualized  Psych:  Normal affect and normal insight  Neuro:  Alert and appropriate  Lab Results:   No visits with results within 1 Day(s) from this visit  Latest known visit with results is:   Appointment on 05/05/2022   Component Date Value    C difficile toxin by PCR 05/05/2022 Negative     Salmonella sp PCR 05/06/2022 None Detected     Shigella sp/Enteroinvasi* 05/06/2022 None Detected     Campylobacter sp (jejuni* 05/06/2022 None Detected     Shiga toxin 1/Shiga toxi* 05/06/2022 None Detected        Lab Results   Component Value Date    WBC 9 55 05/05/2022    HGB 14 2 05/05/2022    HCT 43 4 05/05/2022    MCV 83 05/05/2022     05/05/2022       Lab Results   Component Value Date    SODIUM 135 (L) 05/05/2022    K 3 7 05/05/2022     05/05/2022    CO2 29 05/05/2022    AGAP 3 (L) 05/05/2022    BUN 12 05/05/2022    CREATININE 1 04 05/05/2022    GLUF 81 05/05/2022    CALCIUM 9 7 05/05/2022    AST 16 05/05/2022    ALT 25 05/05/2022    ALKPHOS 76 05/05/2022    TP 8 0 05/05/2022    TBILI 0 88 05/05/2022    EGFR 102 05/05/2022       Lab Results   Component Value Date    CRP 6 7 (H) 05/05/2022       No results found for: [id number], TSH    No results found for: IRON, TIBC, FERRITIN    Radiology Results:   MRI enterography w wo    Result Date: 5/2/2022  Narrative: MRI ABDOMEN AND PELVIS WITH AND WITHOUT CONTRAST (MR ENTEROGRAPHY) INDICATION: 21 years / Male  K52 9: Noninfective gastroenteritis and colitis, unspecified R19 7: Diarrhea, unspecified R10 30: Lower abdominal pain, unspecified R14 0: Abdominal distension (gaseous)  Rectal bleeding and diarrhea  Clinical concern for inflammatory bowel disease  COMPARISON: Report of colonoscopy from 12/27/2020 was reviewed which is states presence of erythematous mucosa in the rectum   TECHNIQUE:  The following pulse sequences of the abdomen and pelvis were obtained: Axial 2D FIESTA with fat saturation, axial and coronal T2, DWI/ADC, pre-contrast coronal T1 with fat saturation, post-contrast dynamic coronal T1 at 20, 70, and 180 seconds with fat saturation, and axial T1 post-contrast with fat saturation through the abdomen and pelvis  Enteric Contrast:  1500 cc of enteric contrast Victorine Brinklow) was administered beginning 45 minute prior to scanning  In addition, 1 mg of glucagon was administered IM prior to contrast administration by the radiology nurse  Imaging performed on 1 5T MRI  IV Contrast:  8 mL of Gadobutrol injection (SINGLE-DOSE) Note that dynamic imaging was tailored for evaluation of the bowel with limited evaluation of the remainder of the abdominal and pelvic viscera  ENTEROGRAPHY: -Small bowel distension: Adequate  -Bowel: There are no segments of bowel wall thickening or hyperenhancement to indicate active inflammatory bowel disease  No disproportionate dilation of the small or large bowel  There is no stricture, fistula, sinus tract, or abscess in the abdomen or pelvis  -Mesenteric findings: None  -Extraintestinal findings: None  REMAINDER OF THE ABDOMEN AND PELVIS: LOWER THORAX: Unremarkable  LIVER:  Normal in size and configuration  No suspicious mass  BILE DUCTS:  No intrahepatic or extrahepatic bile duct dilatation  No choledocholithiasis  GALLBLADDER:  Normal  SPLEEN:  Normal  PANCREAS:  Unremarkable  ADRENAL GLANDS:  Normal  KIDNEYS/PROXIMAL URETERS:  No hydroureteronephrosis  No suspicious renal mass  PERITONEAL CAVITY/RETROPERITONEUM:  No ascites  No mass  LYMPH NODES:  No lymphadenopathy  REPRODUCTIVE STRUCTURES:   Age-appropriate  BLADDER:  Normal  VESSELS:  Patent  No aneurysm  ABDOMINAL/PELVIC WALL:  Unremarkable  OSSEOUS STRUCTURES:  No suspicious osseous lesion  Impression: *  Inflammation: No imaging signs of active inflammation  *  Stricture: None  *  Penetrating complication: None  *  Perianal disease: None  *  Other complications: None   Workstation performed: [id number]

## 2022-05-08 NOTE — PROGRESS NOTES
[facility] - Outpatient Follow-up Note  Tamika Squires 24 y o  male MRN: [id number]  Encounter: [id number]          ASSESSMENT AND PLAN:    Tamika Squires is a 24 y o  male who presents with complaint of rectal bleeding and diarrhea with colonoscopy showing rectal inflammation and biopsies with acute proctitis but no chronicity in the setting of a family history of Crohn's disease in a brother, now presenting for follow-up  He feels better on the mesalamine but still with ongoing symptoms, especially within the past 1-2 weeks  He has intermittent bloating, pain, diarrhea  Bleeding has improved  Recent MRE normal   CMP with low sodium but normal LFTs and creatinine  Celiac serologies negative  CBC normal   C diff negative  Prior calprotectin less than 16  CRP 6 7  Normal fecal fat and pancreatic elastase  Giardia negative  1  Colitis    2  Diarrhea, unspecified type    3  Lower abdominal pain    4  Bloating        Orders Placed This Encounter   Procedures    Flexible Sigmoidoscopy     Continue mesalamine  Continue Rowasa as needed  Repeat flexible sigmoidoscopy with biopsies  Low FODMAP diet  Consider SIBO testing in the future  Repeat CRP and calprotectin in 6 months  Consider IBGard in the future  Alissa COULTERN  ______________________________________________________________________    SUBJECTIVE:    Tamika Squires is a 24 y o  male who presents with complaint of proctitis  He saw a little blood when he did his stool sample, but it was very little and stringy  Small amount of mucous  He is having bloating, pain, lower back pain, leg pain  + exhaustion  He feels better compared to starting the medication but not normal  He has inconsistent stools  It can be formed and it was that way until a week ago but now not  He has not seen blood since starting the medication and then when he gave the stool sample  He has pain in the general abdomen from the bloating   No heartburn, dysphagia, odynophagia, nausea, vomiting, weight loss  No rashes, mouth sores  When he works with his hands he will get cramps  He has a lack of appetite  He has less hunger and will feel full more quickly  He will make himself eat      REVIEW OF SYSTEMS IS OTHERWISE NEGATIVE  10 point ROS reviewed and negative, except as above      Historical Information   Past Medical History:   Diagnosis Date    Acne     History of bilateral inguinal hernias      Past Surgical History:   Procedure Laterality Date    INGUINAL HERNIA REPAIR  2000    WISDOM TOOTH EXTRACTION       Social History   Social History     Substance and Sexual Activity   Alcohol Use Yes    Alcohol/week: 3 0 standard drinks    Types: 1 Glasses of wine, 1 Cans of beer, 1 Shots of liquor per week     Social History     Substance and Sexual Activity   Drug Use Yes    Types: Marijuana     Social History     Tobacco Use   Smoking Status Never Smoker   Smokeless Tobacco Former User     Family History   Problem Relation Age of Onset    No Known Problems Mother     No Known Problems Father     No Known Problems Maternal Grandmother     No Known Problems Maternal Grandfather     No Known Problems Paternal Grandmother     No Known Problems Paternal Grandfather        Meds/Allergies       Current Outpatient Medications:     dicyclomine (BENTYL) 20 mg tablet    mesalamine (LIALDA) 1 2 g EC tablet    mesalamine (ROWASA) 4 g    No Known Allergies        Objective     Blood pressure 112/78, pulse 94, height 5' 9" (1 753 m), weight 77 5 kg (170 lb 12 8 oz), SpO2 98 %  Body mass index is 25 22 kg/m²  PHYSICAL EXAMINATION:    General Appearance:   Alert, cooperative, no distress   HEENT:  Normocephalic, atraumatic, anicteric  Neck supple, symmetrical, trachea midline  Lungs:   Equal chest rise and unlabored breathing, normal effort, no coughing  Cardiovascular:   No visualized JVD  Abdomen:   No abdominal distension     Skin:   No jaundice, rashes, or lesions  Musculoskeletal:   Normal range of motion visualized  Psych:  Normal affect and normal insight  Neuro:  Alert and appropriate  Lab Results:   No visits with results within 1 Day(s) from this visit  Latest known visit with results is:   Appointment on 05/05/2022   Component Date Value    C difficile toxin by PCR 05/05/2022 Negative     Salmonella sp PCR 05/06/2022 None Detected     Shigella sp/Enteroinvasi* 05/06/2022 None Detected     Campylobacter sp (jejuni* 05/06/2022 None Detected     Shiga toxin 1/Shiga toxi* 05/06/2022 None Detected        Lab Results   Component Value Date    WBC 9 55 05/05/2022    HGB 14 2 05/05/2022    HCT 43 4 05/05/2022    MCV 83 05/05/2022     05/05/2022       Lab Results   Component Value Date    SODIUM 135 (L) 05/05/2022    K 3 7 05/05/2022     05/05/2022    CO2 29 05/05/2022    AGAP 3 (L) 05/05/2022    BUN 12 05/05/2022    CREATININE 1 04 05/05/2022    GLUF 81 05/05/2022    CALCIUM 9 7 05/05/2022    AST 16 05/05/2022    ALT 25 05/05/2022    ALKPHOS 76 05/05/2022    TP 8 0 05/05/2022    TBILI 0 88 05/05/2022    EGFR 102 05/05/2022       Lab Results   Component Value Date    CRP 6 7 (H) 05/05/2022       No results found for: [id number], TSH    No results found for: IRON, TIBC, FERRITIN    Radiology Results:   MRI enterography w wo    Result Date: 5/2/2022  Narrative: MRI ABDOMEN AND PELVIS WITH AND WITHOUT CONTRAST (MR ENTEROGRAPHY) INDICATION: 21 years / Male  K52 9: Noninfective gastroenteritis and colitis, unspecified R19 7: Diarrhea, unspecified R10 30: Lower abdominal pain, unspecified R14 0: Abdominal distension (gaseous)  Rectal bleeding and diarrhea  Clinical concern for inflammatory bowel disease  COMPARISON: Report of colonoscopy from 12/27/2020 was reviewed which is states presence of erythematous mucosa in the rectum   TECHNIQUE:  The following pulse sequences of the abdomen and pelvis were obtained: Axial 2D FIESTA with fat saturation, axial and coronal T2, DWI/ADC, pre-contrast coronal T1 with fat saturation, post-contrast dynamic coronal T1 at 20, 70, and 180 seconds with fat saturation, and axial T1 post-contrast with fat saturation through the abdomen and pelvis  Enteric Contrast:  1500 cc of enteric contrast Celena new brunwick) was administered beginning 45 minute prior to scanning  In addition, 1 mg of glucagon was administered IM prior to contrast administration by the radiology nurse  Imaging performed on 1 5T MRI  IV Contrast:  8 mL of Gadobutrol injection (SINGLE-DOSE) Note that dynamic imaging was tailored for evaluation of the bowel with limited evaluation of the remainder of the abdominal and pelvic viscera  ENTEROGRAPHY: -Small bowel distension: Adequate  -Bowel: There are no segments of bowel wall thickening or hyperenhancement to indicate active inflammatory bowel disease  No disproportionate dilation of the small or large bowel  There is no stricture, fistula, sinus tract, or abscess in the abdomen or pelvis  -Mesenteric findings: None  -Extraintestinal findings: None  REMAINDER OF THE ABDOMEN AND PELVIS: LOWER THORAX: Unremarkable  LIVER:  Normal in size and configuration  No suspicious mass  BILE DUCTS:  No intrahepatic or extrahepatic bile duct dilatation  No choledocholithiasis  GALLBLADDER:  Normal  SPLEEN:  Normal  PANCREAS:  Unremarkable  ADRENAL GLANDS:  Normal  KIDNEYS/PROXIMAL URETERS:  No hydroureteronephrosis  No suspicious renal mass  PERITONEAL CAVITY/RETROPERITONEUM:  No ascites  No mass  LYMPH NODES:  No lymphadenopathy  REPRODUCTIVE STRUCTURES:   Age-appropriate  BLADDER:  Normal  VESSELS:  Patent  No aneurysm  ABDOMINAL/PELVIC WALL:  Unremarkable  OSSEOUS STRUCTURES:  No suspicious osseous lesion  Impression: *  Inflammation: No imaging signs of active inflammation  *  Stricture: None  *  Penetrating complication: None  *  Perianal disease: None  *  Other complications: None   Workstation performed: [id number]

## 2022-05-09 ENCOUNTER — OFFICE VISIT (OUTPATIENT)
Dept: GASTROENTEROLOGY | Facility: MEDICAL CENTER | Age: 22
End: 2022-05-09
Payer: COMMERCIAL

## 2022-05-09 VITALS
HEART RATE: 94 BPM | SYSTOLIC BLOOD PRESSURE: 112 MMHG | OXYGEN SATURATION: 98 % | BODY MASS INDEX: 25.3 KG/M2 | DIASTOLIC BLOOD PRESSURE: 78 MMHG | WEIGHT: 170.8 LBS | HEIGHT: 69 IN

## 2022-05-09 DIAGNOSIS — R14.0 BLOATING: ICD-10-CM

## 2022-05-09 DIAGNOSIS — R10.30 LOWER ABDOMINAL PAIN: ICD-10-CM

## 2022-05-09 DIAGNOSIS — K52.9 COLITIS: Primary | ICD-10-CM

## 2022-05-09 DIAGNOSIS — R19.7 DIARRHEA, UNSPECIFIED TYPE: ICD-10-CM

## 2022-05-09 LAB — G LAMBLIA AG STL QL IA: NEGATIVE

## 2022-05-09 PROCEDURE — 3008F BODY MASS INDEX DOCD: CPT | Performed by: NURSE PRACTITIONER

## 2022-05-09 PROCEDURE — 99214 OFFICE O/P EST MOD 30 MIN: CPT | Performed by: INTERNAL MEDICINE

## 2022-05-09 RX ORDER — DICYCLOMINE HCL 20 MG
20 TABLET ORAL EVERY 6 HOURS PRN
Qty: 120 TABLET | Refills: 2 | Status: SHIPPED | OUTPATIENT
Start: 2022-05-09

## 2022-05-09 NOTE — PATIENT INSTRUCTIONS
Scheduled date of Flex Sig (as of today): 05/27/2022  Physician performing: Dr Ezekiel Gaxiola  Location of procedure:  [facility]  Bowel prep reviewed with patient: Flex Sig & 2 enemas  Instructions reviewed with patient by:  MA  Clearances: n/a

## 2022-05-16 ENCOUNTER — TELEMEDICINE (OUTPATIENT)
Dept: FAMILY MEDICINE CLINIC | Facility: CLINIC | Age: 22
End: 2022-05-16
Payer: COMMERCIAL

## 2022-05-16 VITALS — TEMPERATURE: 96.1 F

## 2022-05-16 DIAGNOSIS — U07.1 COVID-19 VIRUS INFECTION: Primary | ICD-10-CM

## 2022-05-16 PROCEDURE — 99213 OFFICE O/P EST LOW 20 MIN: CPT | Performed by: NURSE PRACTITIONER

## 2022-05-16 NOTE — LETTER
May 16, 2022     Patient: Jorge Wynn  YOB: 2000  Date of Visit: 5/16/2022      To Whom it May Concern:    Addy West is under my professional care  Valerie Whitman was seen in my office on 5/16/2022  Valerie Whitman may return to work on 5/19/22  If you have any questions or concerns, please don't hesitate to call           Sincerely,      DIANA Hardwick

## 2022-05-16 NOTE — PROGRESS NOTES
COVID-19 Outpatient Progress Note    Assessment/Plan:    Problem List Items Addressed This Visit        Other    COVID-19 virus infection - Primary         Disposition:     I recommended continued isolation until at least 24 hours have passed since recovery defined as resolution of fever without the use of fever-reducing medications AND improvement in COVID symptoms AND 10 days have passed since onset of symptoms (or 10 days have passed since date of first positive viral diagnostic test for asymptomatic patients)  I have spent 15 minutes directly with the patient  Greater than 50% of this time was spent in counseling/coordination of care regarding: prognosis, instructions for management, patient and family education and impressions  Encounter provider Reyes Morelle, CRNP    Provider located at [facility] AT [address] [facility]  [address] [place] [id number]    Recent Visits  No visits were found meeting these conditions  Showing recent visits within past 7 days and meeting all other requirements  Today's Visits  Date Type Provider Dept   05/16/22 Telemedicine Reyes Morelle, [address] [facility]   Showing today's visits and meeting all other requirements  Future Appointments  No visits were found meeting these conditions  Showing future appointments within next 150 days and meeting all other requirements     This virtual check-in was done via [employer] and patient was informed that this is a secure, HIPAA-compliant platform  He agrees to proceed  Patient agrees to participate in a virtual check in via telephone or video visit instead of presenting to the office to address urgent/immediate medical needs  Patient is aware this is a billable service  After connecting through [facility], the patient was identified by name and date of birth   Danyd Bennett was informed that this was a telemedicine visit and that the exam was being conducted confidentially over secure lines  My office door was closed  No one else was in the room  Kavitha Jain acknowledged consent and understanding of privacy and security of the telemedicine visit  I informed the patient that I have reviewed his record in Epic and presented the opportunity for him to ask any questions regarding the visit today  The patient agreed to participate  Verification of patient location:  Patient is located in the following state in which I hold an active license: [place]    Subjective:   Kavitha Jain is a 24 y o  male who has been screened for COVID-19  Symptom change since last report: improving  Patient's symptoms include fatigue (Improving), malaise, sore throat, loss of taste, cough, chest tightness and headache  Patient denies fever, chills, congestion, rhinorrhea, anosmia, shortness of breath, abdominal pain, nausea, vomiting, diarrhea and myalgias  - Date of symptom onset: 5/6/2022  - Date of positive COVID-19 test: 5/11/2022  Type of test: Home antigen  Home antigen result verified by provider  Will get picture of test uploaded/scanned into patient's chart  COVID-19 vaccination status: Not vaccinated    Genie Tolliver has been staying home and has isolated themselves in his home  He is taking care to not share personal items and is cleaning all surfaces that are touched often, like counters, tabletops, and doorknobs using household cleaning sprays or wipes  He is wearing a mask when he leaves his room       No results found for: Indigo Leon, SARSCORONAVI, CORONAVIRUSR, SARSCOVAG, [address] [facility]  Past Medical History:   Diagnosis Date    Acne     History of bilateral inguinal hernias      Past Surgical History:   Procedure Laterality Date    INGUINAL HERNIA REPAIR  2000    WISDOM TOOTH EXTRACTION       Current Outpatient Medications   Medication Sig Dispense Refill    dicyclomine (BENTYL) 20 mg tablet Take 1 tablet (20 mg total) by mouth every 6 (six) hours as needed (urgency, abdominal pain) 120 tablet 2    mesalamine (ROWASA) 4 g Insert 60 mL (4 g total) into the rectum daily at bedtime 1800 mL 3    mesalamine (LIALDA) 1 2 g EC tablet Take 4 tablets (4 8 g total) by mouth daily with breakfast 120 tablet 3     No current facility-administered medications for this visit  No Known Allergies    Review of Systems   Constitutional: Positive for fatigue (Improving)  Negative for chills and fever  HENT: Positive for sore throat  Negative for congestion and rhinorrhea  Respiratory: Positive for cough and chest tightness  Negative for shortness of breath  Gastrointestinal: Negative for abdominal pain, diarrhea, nausea and vomiting  Musculoskeletal: Negative for myalgias  Neurological: Positive for headaches  Objective:    Vitals:    05/16/22 0824   Temp: (!) 96 1 °F (35 6 °C)   TempSrc: Oral       Physical Exam  Constitutional:       Appearance: Normal appearance  Pulmonary:      Effort: Pulmonary effort is normal  No respiratory distress (No dyspnea or cough noted while conversing)  Neurological:      Mental Status: He is alert and oriented to person, place, and time  Psychiatric:         Mood and Affect: Mood normal          Behavior: Behavior normal          VIRTUAL VISIT DISCLAIMER    Ivonejassi Gongora verbally agrees to participate in [employer]  Pt is aware that [employer] could be limited without vital signs or the ability to perform a full hands-on physical exam  Radha Gerardo understands he or the provider may request at any time to terminate the video visit and request the patient to seek care or treatment in person

## 2022-05-18 ENCOUNTER — TELEMEDICINE (OUTPATIENT)
Dept: FAMILY MEDICINE CLINIC | Facility: CLINIC | Age: 22
End: 2022-05-18
Payer: COMMERCIAL

## 2022-05-18 ENCOUNTER — TELEPHONE (OUTPATIENT)
Dept: FAMILY MEDICINE CLINIC | Facility: CLINIC | Age: 22
End: 2022-05-18

## 2022-05-18 VITALS — TEMPERATURE: 96.1 F

## 2022-05-18 DIAGNOSIS — U07.1 COVID-19 VIRUS INFECTION: ICD-10-CM

## 2022-05-18 PROCEDURE — 1036F TOBACCO NON-USER: CPT | Performed by: NURSE PRACTITIONER

## 2022-05-18 PROCEDURE — 99213 OFFICE O/P EST LOW 20 MIN: CPT | Performed by: NURSE PRACTITIONER

## 2022-05-18 NOTE — LETTER
May 18, 2022     Patient: Ajith Simon  YOB: 2000  Date of Visit: 5/18/2022      To Whom it May Concern:    Mike Lainey is under my professional care  Tha De Paz was seen in my office on 5/18/2022  Tha De Paz may return to work on 5/23/22 due to continuing Covid symptoms  If you have any questions or concerns, please don't hesitate to call           Sincerely,        Camellia Severs, CRNP

## 2022-05-18 NOTE — PROGRESS NOTES
COVID-19 Outpatient Progress Note    Assessment/Plan:    Problem List Items Addressed This Visit        Other    COVID-19 virus infection         Disposition:     I recommended continued isolation until at least 24 hours have passed since recovery defined as resolution of fever without the use of fever-reducing medications AND improvement in COVID symptoms AND 10 days have passed since onset of symptoms (or 10 days have passed since date of first positive viral diagnostic test for asymptomatic patients)  I have spent 15 minutes directly with the patient  Greater than 50% of this time was spent in counseling/coordination of care regarding: prognosis, instructions for management, patient and family education, importance of treatment compliance and impressions  Encounter provider DIANA Garcia    Provider located at [facility] AT [address] [facility]  [address] [place] [id number]    Recent Visits  Date Type Provider Dept   05/16/22 Telemedicine Ilya Hence, [address]   Showing recent visits within past 7 days and meeting all other requirements  Today's Visits  Date Type Provider Dept   05/18/22 Telemedicine Ilya Hence, [address]   05/18/22 Telephone Twila Karimi, [address] today's visits and meeting all other requirements  Future Appointments  No visits were found meeting these conditions  Showing future appointments within next 150 days and meeting all other requirements     This virtual check-in was done via Wizeline and patient was informed that this is a secure, HIPAA-compliant platform  He agrees to proceed  Patient agrees to participate in a virtual check in via telephone or video visit instead of presenting to the office to address urgent/immediate medical needs  Patient is aware this is a billable service      After connecting through [facility], the patient was Lisa Nunez  : 818  [place]  Secondary: Jb Germain at [address], [address], [place] [address].  [phone]   [phone]          OUTPATIENT PHYSICAL [address] [facility] 2018   ICD-10: Treatment Diagnosis: Low back pain (M54.5)  Precautions/Allergies:   Latex; Lactose; Adhesive; Atorvastatin; Augmentin [amoxicillin-pot clavulanate]; Codeine; Ezetimibe; Fluoxetine; Percocet [oxycodone-acetaminophen]; Potassium; Pravastatin; Prednisone; Simvastatin; and Wellbutrin [bupropion]   Fall Risk Score: 1 (? 5 = High Risk)  MD Orders: Evaluate and treat, pool/water aquatics for lower back pain MEDICAL/REFERRING DIAGNOSIS:  back   DATE OF ONSET: Chronic condition  REFERRING PHYSICIAN: Kyaw Ballesteros, *  RETURN PHYSICIAN APPOINTMENT: TBD     INITIAL ASSESSMENT:  Ms. Mariely Paulino presents with pain in many body regions, with referral to aquatic therapy due to recalcitrant low back pain. Patient demonstrates limited lumbar range of motion, impaired posture, decreased strength, altered gait mechanics and a generalized reduction in physical functioning. Patient is likely to benefit from continued PT to reduce low back pain and improve her overall physical functioning. PROBLEM LIST (Impacting functional limitations):  1. Decreased Strength  2. Decreased Ambulation Ability/Technique  3. Increased Pain  4. Decreased Activity Tolerance  5. Decreased Flexibility/Joint Mobility  6. Decreased [place] with Home Exercise Program INTERVENTIONS PLANNED:  1. Home Exercise Program (HEP)  2. Manual Therapy  3. Neuromuscular Re-education/Strengthening  4. Range of Motion (ROM)  5. Therapeutic Activites  6. Therapeutic Exercise/Strengthening  7. Aquatic therapy   TREATMENT PLAN:  Effective Dates: 10/10/2018 TO 2019.   Frequency/Duration: 2 visits per week for 90 Days  GOALS: (Goals have been discussed and agreed upon with patient.)  Short-Term Functional Goals: Time Frame: 45 days  1. Patient will report 0-1/10 low back with normalized activities and improve score on Oswestry to <35% disability  2. Patient will be able to tolerate 45 minutes of aquatic physical therapy  3. Patient will be independent with HEP  Discharge Goals: Time Frame: 90 days  1. Patient will be able to ambulate >20 minutes without having to stop due to low back pain  2. Patient will have 5/5 hip abduction strength  3. Patient will report a subjective 50% improvement in overall function    Rehabilitation Potential For Stated Goals: FAIR                The information in this section was collected on 10-10-18 (except where otherwise noted). HISTORY:   History of Present Injury/Illness (Reason for Referral):  Patient reports a long history of lower back pain, and underwent surgery for a \"clean up\" 4 or 5 years ago. She reports that this helped initially, but has since not controlled the pain, and she has been receiving steroid injections, which have not been working. She has also received chiropractic treatment and sees a massage therapist to address her pain. She had a R total hip replacement 3-4 years ago, and underwent a R knee arthroscopy in 2008. Had an ICD implanted in 2017. Past Medical History/Comorbidities:   Ms. Caleb Kline  has a past medical history of Anemia, Anxiety, Arthritis, Bundle branch block, left, CAD (coronary artery disease), Chicken pox, Chronic systolic CHF (congestive heart failure) ([facility] [address] Claustrophobia, Depression, Diverticulitis, Elevated cholesterol, H/O: whooping cough, Hiatal hernia, Hypertension, Measles, Mumps, NSAID sensitivity, Post-menopausal, PUD (peptic ulcer disease), and Pyloric ulcer associated with Helicobacter pylori.   Ms. Caleb Kline  has a past surgical history that includes hx cataract removal (Bilateral, 2011); hx knee arthroscopy (Right, 2007); hx hip replacement (Right); hx hernia repair (2011); pr identified by name and date of birth  Tamika Squires was informed that this was a telemedicine visit and that the exam was being conducted confidentially over secure lines  My office door was closed  No one else was in the room  Tamika Squires acknowledged consent and understanding of privacy and security of the telemedicine visit  I informed the patient that I have reviewed his record in Epic and presented the opportunity for him to ask any questions regarding the visit today  The patient agreed to participate  Verification of patient location:  Patient is located in the following state in which I hold an active license: [place]    Subjective:   Tamika Squires is a 24 y o  male who has been screened for COVID-19  Symptom change since last report: improving  Patient's symptoms include fatigue, sore throat, anosmia (improving), loss of taste, cough, shortness of breath and myalgias  Patient denies fever, chills, malaise, congestion, rhinorrhea, chest tightness, abdominal pain, nausea, vomiting, diarrhea and headaches  - Date of symptom onset: 5/6/2022      COVID-19 vaccination status: Not vaccinated    Srinivasa Tee has been staying home and has isolated themselves in his home  He is taking care to not share personal items and is cleaning all surfaces that are touched often, like counters, tabletops, and doorknobs using household cleaning sprays or wipes  He is wearing a mask when he leaves his room       No results found for: Delmon Crimes, SARSCORONAVI, CORONAVIRUSR, SARSCOVAG, [facility]  Past Medical History:   Diagnosis Date    Acne     History of bilateral inguinal hernias      Past Surgical History:   Procedure Laterality Date    INGUINAL HERNIA REPAIR  2000    WISDOM TOOTH EXTRACTION       Current Outpatient Medications   Medication Sig Dispense Refill    dicyclomine (BENTYL) 20 mg tablet Take 1 tablet (20 mg total) by mouth every 6 (six) hours as needed (urgency, abdominal pain) (Patient not neurological procedure unlisted; hx breast biopsy (Left); pr left heart cath,percutaneous (2010/2004); hx heart catheterization (2016); hx other surgical (03/2017); hx pacemaker; hx gyn (Right); LAPAROSCOPIC EXAM/BIOPSY RIGHT OVARY (N/A, 2/20/2018); BIV ICD (N/A, 3/17/2017); RIGHT L4-5 ARABELLA LAMINECTOMY (Right, 1/6/2016); HIP ARTHROPLASTY TOTAL (Right, 3/8/1341); NISSEN FUNDOPLICATION LAPAROSCOPIC (N/A, 9/16/2011); HERNIA HIATAL REPAIR LAPAROSCOPIC (N/A, 9/16/2011); and GASTROSTOMY TUBE INSERTION LAPAROSCOPIC (N/A, 9/16/2011). Social History/Living Environment:    Patient lives alone in [address] [place] [address] in a [address] [facility] home. She has one small step to enter house, which is one level. Prior Level of Function/Work/Activity:  Patient is retired. Does use exercise equipment at [facility] on occasion. States that she typically uses a cane when ambulating at home. Current Medications:  Per attached handout at initial evaluation on 10-10-18     Current Outpatient Prescriptions:      Amiodarone  mg (1/2 tablet in morning)  - Amlodipine Besylate 5 MG tablet in morning  - Duloxetine HCL 60 MG cap (1 morning capsule)  - Hydrochlorothiazide 12.5 MG tablet (1 in the morning)  - Losartan postassium 25 mg tablet (2 in AM)  -  Olopatadine HCL 0.2% eye drop (1 in AM)  - Restasis multidose 0.05% eye   - Rexulti 1 MG tablet (1/2 in PM)  - Duloxetine HCL 30 MG cap (1 per day)  - Zolpidem tartrate 5 MG tablet (1 per day)   Date Last Reviewed:  10-29-18   Number of Personal Factors/Comorbidities that affect the Plan of Care: 1-2: MODERATE COMPLEXITY   EXAMINATION:   10-10-18    Observation/Orthostatic Postural Assessment:          Significant forward head posture with kyphotic posture. Ambulates with forward trunk lean and reduced gait speed with small steps bilaterally.   Palpation:          Tenderness and tightness to palpation of R lumbar paraspinals  ROM:          Lumbar flexion reduced by 50% with forward bending, and taking: Reported on 5/18/2022) 120 tablet 2    mesalamine (LIALDA) 1 2 g EC tablet Take 4 tablets (4 8 g total) by mouth daily with breakfast 120 tablet 3    mesalamine (ROWASA) 4 g Insert 60 mL (4 g total) into the rectum daily at bedtime 1800 mL 3     No current facility-administered medications for this visit  No Known Allergies    Review of Systems   Constitutional: Positive for fatigue  Negative for chills and fever  HENT: Positive for sore throat  Negative for congestion and rhinorrhea  Respiratory: Positive for cough and shortness of breath  Negative for chest tightness  Gastrointestinal: Negative for abdominal pain, diarrhea, nausea and vomiting  Musculoskeletal: Positive for myalgias  Neurological: Negative for headaches  Objective:    Vitals:    05/18/22 1219   Temp: (!) 96 1 °F (35 6 °C)   TempSrc: Oral       Physical Exam  Constitutional:       Appearance: Normal appearance  Pulmonary:      Effort: Pulmonary effort is normal  No respiratory distress (No dyspnea or sough noted while conversing)  Neurological:      Mental Status: He is alert and oriented to person, place, and time  Psychiatric:         Mood and Affect: Mood normal          Behavior: Behavior normal          VIRTUAL VISIT DISCLAIMER    Maia Said verbally agrees to participate in [employer]  Pt is aware that [employer] could be limited without vital signs or the ability to perform a full hands-on physical exam  Radha Peterson understands he or the provider may request at any time to terminate the video visit and request the patient to seek care or treatment in person  back bending 75-80% reduced actively. Limited hip extension during gait bilaterally. Strength:          Hip flexion 5/5 B        Hip abduction 4/5 B        Knee extension 5/5 B        Knee flexion 5/5 B        Ankle DF 5/5 B  Special Tests:          None  Neurological Screen:       Sensation to light touch intact bilaterally along all B LE dermatomes  Functional Mobility:         Sit to stands: uses hands to push up from seated position        Gait: Patient ambulates with reduced gait speed, with reduced step length bilaterally, and reduced hip extension bilaterally. Patient is    Body Structures Involved:  1. Nerves  2. Bones  3. Joints  4. Muscles Body Functions Affected:  1. Sensory/Pain  2. Neuromusculoskeletal  3. Movement Related Activities and Participation Affected:  1. Mobility  2. Self Care  3. Community, Social and [place]   Number of elements (examined above) that affect the Plan of Care: 4+: HIGH COMPLEXITY   CLINICAL PRESENTATION:   Presentation: Stable and uncomplicated: LOW COMPLEXITY   CLINICAL DECISION MAKING:   Outcome Measure: Tool Used: Modified Oswestry Low Back Pain Questionnaire  Score:  Initial: 12 (only completed front page)/50  Most Recent: X/50 (Date: -- )   Interpretation of Score: Each section is scored on a 0-5 scale, 5 representing the greatest disability. The scores of each section are added together for a total score of 50. Score 0 1-10 11-20 21-30 31-40 41-49 50   Modifier CH CI CJ CK CL CM CN     ? Mobility - Walking and Moving Around:     - CURRENT STATUS: CK - 40%-59% impaired, limited or restricted    - GOAL STATUS: CJ - 20%-39% impaired, limited or restricted    - D/C STATUS:  ---------------To be determined---------------    Medical Necessity:   · Skilled intervention continues to be required due to low back pain and difficulty with walking for prolonged distances.   Reason for Services/Other Comments:  · Patient continues to require skilled intervention due to low back pain. Use of outcome tool(s) and clinical judgement create a POC that gives a: Clear prediction of patient's progress: LOW COMPLEXITY            TREATMENT:   (In addition to Assessment/Re-Assessment sessions the following treatments were rendered)  Pre-treatment Symptoms/Complaints:  Pt with no complaints of pain today. Pain: Initial:     not rated Post Session:  Not rated     THERAPEUTIC EXERCISE ( minutes) for reduced low back pain. Performed 2 x 10 of posterior pelvic tilts, single knee-to-chest stretch, with instructions to perform very cautiously on R LE due to presence of R total hip, and posterior pelvic tilt with small bridge in order to decrease lower back pain, strengthen patient's hips and facilitate lumbar flexion to reduce discomfort. HEP:  Patient provided with handout with written instructions and pictures on performance of the exercises above, with instructions not to bring R hip beyond 90 degrees due to R MANJIT. Patient verbalized understanding. Aquatic Therapy (45 minutes): Aquatic treatment performed per flow grid for Decreased muscle strength, Decreased endurance, Decreased static/dynamic balance and reactive control, Decreased range of motion, Decreased activity endurance, Ease of movement and Low impact and reduced weight bearing activity. Cues provided for posture and exercises.        Aquatic Exercise Log       Date  10/16/18 Date  10/18/18 Date  10/29 Date  11/1/18 Date     Activity/ Exercise Parameters Parameters Parameters Parameters Parameters   Walking forward 6 6 6 6    Walking backward 6 6 6 6    Walking sideways 6 6 6 6      Marching 6 6 6 6      Goose Step 6 6 6 6      Tip toes 3 3 3 3      Heels 3 3 3 3      Lunges Long step 6 Long step 6 Long step 6 Long step 6    Side step squats        LE Exercises 4.5'  4.5' 2# 2# wts      Hip Flex/Ext 10 15 15 15      Hip Abd/Add 10 15 15 15      Hip IR/ER          Calf raises 10 15 15 15      Knee Flex 10 15 15 15      Squats          Leg Circles 10/10 15/15 15/15 15/15      Step Ups        UE Exercises          Squeeze In          Push Down          Pull Down          Bicep/Tricep        Rows/Press outs         Chi Positions          Trunk Rotation        Deep H2O/ Noodles  7' with blue rings 7' with rings and 2# 7' with blue rings and 2# wts      Stabilization    Traction x4 min hanging in 7' with 2# wts      Arms only          Legs only  Bicycle 1 min 1 min 2 min    [address]  1 min 1 min 2 min      Scissors  1 min 1 min 2 min      Crab walk        Lower abdominal   work   DKTC 1 min  DKTC 2 min       Cardio          Jogging        Lap   Swimming          Stretches          Hamstrings          Heelcords          Piriformis          Neck          Treatment/Session Assessment:    · Response to Treatment:  Pt continues to do well with weight and added time in 7' area today. · Compliance with Program/Exercises: Will assess as treatment progresses  · Recommendations/Intent for next treatment session: \"Next visit will focus on aquatic physical therapy.   Total Treatment Duration: 45 minutes  PT Patient Time In/Time Out  Time In: 1045  Time Out: [address] [place], PTA

## 2022-05-27 ENCOUNTER — TELEPHONE (OUTPATIENT)
Dept: GASTROENTEROLOGY | Facility: MEDICAL CENTER | Age: 22
End: 2022-05-27

## 2022-05-27 ENCOUNTER — HOSPITAL ENCOUNTER (OUTPATIENT)
Dept: GASTROENTEROLOGY | Facility: MEDICAL CENTER | Age: 22
Setting detail: OUTPATIENT SURGERY
Discharge: HOME | End: 2022-05-27
Attending: INTERNAL MEDICINE | Admitting: INTERNAL MEDICINE
Payer: COMMERCIAL

## 2022-05-27 VITALS
HEART RATE: 70 BPM | DIASTOLIC BLOOD PRESSURE: 63 MMHG | TEMPERATURE: 99.4 F | RESPIRATION RATE: 18 BRPM | SYSTOLIC BLOOD PRESSURE: 135 MMHG | OXYGEN SATURATION: 100 %

## 2022-05-27 DIAGNOSIS — K52.9 COLITIS: ICD-10-CM

## 2022-05-27 DIAGNOSIS — R10.30 LOWER ABDOMINAL PAIN: ICD-10-CM

## 2022-05-27 DIAGNOSIS — R14.0 BLOATING: ICD-10-CM

## 2022-05-27 DIAGNOSIS — R19.7 DIARRHEA, UNSPECIFIED TYPE: ICD-10-CM

## 2022-05-27 PROCEDURE — 45331 SIGMOIDOSCOPY AND BIOPSY: CPT | Performed by: INTERNAL MEDICINE

## 2022-05-27 PROCEDURE — 88305 TISSUE EXAM BY PATHOLOGIST: CPT | Performed by: PATHOLOGY

## 2022-05-27 NOTE — INTERVAL H&P NOTE
H&P reviewed  After examining the patient I find no changes in the patients condition since the H&P had been written      Vitals:    05/27/22 0648   BP: 131/71   Pulse: 78   Resp: 16   Temp: 99 4 °F (37 4 °C)   SpO2: 98%

## 2022-05-27 NOTE — LETTER
To whom this may concern,    Viviane Alarcon is a patient of [facility] and under the care of Dr Arabella Gallo  Please excuse Mr Hunte Ryley from work on 5/27/2022  If you have any questions, please reach out to the number listed above  Thank you      Sincerely,     [facility]

## 2022-05-27 NOTE — TELEPHONE ENCOUNTER
----- [facility] sent at 5/27/2022  8:18 AM EDT -----  Regarding: Blood work  I decided to take the day off from work, you had mentioned you would be able to write a note for me  I would appreciate it if you could please   Thank you again for seeing me earlier and have a good weekend

## 2022-06-05 DIAGNOSIS — K52.9 COLITIS: ICD-10-CM

## 2022-06-05 RX ORDER — MESALAMINE 4 G/60ML
4 ENEMA RECTAL
Qty: 1680 ML | Refills: 0 | Status: SHIPPED | OUTPATIENT
Start: 2022-06-05 | End: 2022-06-29

## 2022-06-29 DIAGNOSIS — K52.9 COLITIS: ICD-10-CM

## 2022-06-29 RX ORDER — MESALAMINE 4 G/60ML
4 ENEMA RECTAL
Qty: 1680 ML | Refills: 0 | Status: SHIPPED | OUTPATIENT
Start: 2022-06-29 | End: 2022-07-31

## 2022-07-31 DIAGNOSIS — K52.9 COLITIS: ICD-10-CM

## 2022-07-31 RX ORDER — MESALAMINE 4 G/60ML
4 ENEMA RECTAL
Qty: 1680 ML | Refills: 0 | Status: SHIPPED | OUTPATIENT
Start: 2022-07-31 | End: 2022-09-21

## 2022-08-31 ENCOUNTER — TELEPHONE (OUTPATIENT)
Dept: GASTROENTEROLOGY | Facility: CLINIC | Age: 22
End: 2022-08-31

## 2022-08-31 NOTE — TELEPHONE ENCOUNTER
Spoke with the patient  He saw 1 small amount of bleeding  A few times there was blood  The last few stools without blood  He has some abdominal pain  He takes dicyclomine and it helps  He noticed bruising on his lower extremities       Plan:  2-3 times per day take IBGuard  Continue dicyclomine as needed  Stop enema  Drink at least 8 cups of water per day  Try strict low FODMAP diet for at least 2 weeks  Food and symptom journal

## 2022-09-21 ENCOUNTER — TELEPHONE (OUTPATIENT)
Dept: GASTROENTEROLOGY | Facility: CLINIC | Age: 22
End: 2022-09-21

## 2022-09-21 ENCOUNTER — OFFICE VISIT (OUTPATIENT)
Dept: GASTROENTEROLOGY | Facility: CLINIC | Age: 22
End: 2022-09-21
Payer: COMMERCIAL

## 2022-09-21 VITALS
HEART RATE: 83 BPM | SYSTOLIC BLOOD PRESSURE: 131 MMHG | BODY MASS INDEX: 25.08 KG/M2 | OXYGEN SATURATION: 98 % | TEMPERATURE: 98.5 F | WEIGHT: 169.8 LBS | DIASTOLIC BLOOD PRESSURE: 79 MMHG

## 2022-09-21 DIAGNOSIS — K62.5 RECTAL BLEEDING: Primary | ICD-10-CM

## 2022-09-21 DIAGNOSIS — R10.30 LOWER ABDOMINAL PAIN: ICD-10-CM

## 2022-09-21 DIAGNOSIS — K52.9 COLITIS: ICD-10-CM

## 2022-09-21 DIAGNOSIS — R14.0 BLOATING: ICD-10-CM

## 2022-09-21 PROCEDURE — 99213 OFFICE O/P EST LOW 20 MIN: CPT | Performed by: INTERNAL MEDICINE

## 2022-09-21 NOTE — PROGRESS NOTES
[facility] - Outpatient Follow-up Note  Jeanna Hughes 25 y o  male MRN: [id number]  Encounter: [id number]          ASSESSMENT AND PLAN:    Jeanna Hughes is a 25 y o  male who is being followed for intermittent rectal bleeding as well as GI symptoms with abdominal pain, and diarrhea/loose stools past, who is presenting for follow-up  He has had some improvement with diet as well as with the peppermint oil (IBGuard) but he still continues to have symptoms such as discomfort/bloating, intermittent rectal bleeding  The pathology/differential for this is unclear  Based on his prior evaluations, as noted below, I do not suspect this is from proctitis but there may be some anorectal irritation or pathology such as hemorrhoids, fissures, local irritation  The bloating may also be reflective of small intestinal bacterial overgrowth or gut dysmotility  Colonoscopy from January showed some rectal erythema  Biopsies throughout the colon was normal but there was some focal active cryptitis in the rectum  Flexible sigmoidoscopy in May with mild erythema in the rectum  Biopsies were normal   EGD in January with small hiatal hernia and some gastric erythema  The duodenal biopsies had patchy intraepithelial lymphocytes and there was some inactive gastritis  MR enterography from April was normal     CMP from May with sodium of 135 but otherwise normal   CBC normal   C diff negative, Giardia negative, calprotectin less than 16  CRP 6 7  fecal fat and pancreatic elastase normal    1  Rectal bleeding    2  Colitis    3  Lower abdominal pain    4   Bloating        Orders Placed This Encounter   Procedures    Calprotectin,Fecal    Small intestinal bacterial overgrowth    C-reactive protein     Repeat inflammatory markers (CRP and fecal calprotectin)  Continue IBGuard  Continue Low FODMAP diet but try to eat 5 smaller meals per day (instead of a traditional 3 meals per day)  Drink at least 8 cups of water per day  SIBO testing for bacterial overgrowth  Over the counter Preparation H suppositories and cream if bleeding returns    ______________________________________________________________________    SUBJECTIVE:    Wai Goodman is a 25 y o  male who presents with complaint of rectal bleeding  I had spoken with the patient August 31st when he had noted 1 small amount of bleeding and this and happen a few times, as well as some abdominal pain that was improved with dicyclomine  He notes no rectal bleeding for the past few weeks, but now he had some today  No straining  He usually has a BM right away in the morning  At times he will want to go but he can not push  He might sit and try to go  He had a little bit of a burn afterwards  He has the same symptoms and not much difference  He is on IBGuard and it did help after he took it the first day  It makes him more sensitive for movement (like bending over)  He had some nausea initially when he took the IBGuard  + bloating  He has been following the low FODMAP diet and he feels like it helped  He reintroduced back some foods  Random leg pain below the knee  Some joint pains in his knees     Weight stable      Answers for HPI/ROS submitted by the patient on 9/20/2022  Chronicity: chronic  Onset: more than 1 month ago  Onset quality: gradual  Frequency: constantly  Episode duration: 4 hours  Progression since onset: waxing and waning  Pain location: periumbilical region  Pain - numeric: 4/10  Pain quality: dull  Radiates to: periumbilical region  anorexia: Yes  arthralgias: Yes  belching: Yes  constipation: No  diarrhea: Yes  dysuria: No  fever: No  flatus: No  frequency: Yes  headaches: No  hematochezia: No  hematuria: No  melena: No  myalgias: Yes  nausea: Yes  weight loss: No  vomiting: No  Aggravated by: certain positions  Relieved by: recumbency  Diagnostic workup: lower endoscopy, upper endoscopy        REVIEW OF SYSTEMS IS OTHERWISE NEGATIVE  10 point ROS reviewed and negative, except as above      Historical Information   Past Medical History:   Diagnosis Date    Acne     History of bilateral inguinal hernias      Past Surgical History:   Procedure Laterality Date    COLONOSCOPY      EGD      INGUINAL HERNIA REPAIR  2000    WISDOM TOOTH EXTRACTION       Social History   Social History     Substance and Sexual Activity   Alcohol Use Yes    Alcohol/week: 3 0 standard drinks    Types: 1 Glasses of wine, 1 Cans of beer, 1 Shots of liquor per week    Comment: not recently     Social History     Substance and Sexual Activity   Drug Use Not Currently    Types: Marijuana     Social History     Tobacco Use   Smoking Status Never Smoker   Smokeless Tobacco Former User     Family History   Problem Relation Age of Onset    No Known Problems Mother     No Known Problems Father     No Known Problems Maternal Grandmother     No Known Problems Maternal Grandfather     No Known Problems Paternal Grandmother     No Known Problems Paternal Grandfather        Meds/Allergies     No current outpatient medications on file  No Known Allergies        Objective     Blood pressure 131/79, pulse 83, temperature 98 5 °F (36 9 °C), temperature source Tympanic, weight 77 kg (169 lb 12 8 oz), SpO2 98 %  Body mass index is 25 08 kg/m²  PHYSICAL EXAMINATION:    General Appearance:   Alert, cooperative, no distress   HEENT:  Normocephalic, atraumatic, anicteric  Neck supple, symmetrical, trachea midline  Lungs:   Equal chest rise and unlabored breathing, normal effort, no coughing  Cardiovascular:   No visualized JVD  Abdomen:   No abdominal distension  Skin:   No jaundice, rashes, or lesions  Musculoskeletal:   Normal range of motion visualized  Psych:  Normal affect and normal insight  Neuro:  Alert and appropriate  Lab Results:   No visits with results within 1 Day(s) from this visit     Latest known visit with results is: Hospital Outpatient Visit on 05/27/2022   Component Date Value    Case Report 05/27/2022                      Value:Surgical Pathology Report                         Case: [id number]                                   Authorizing Provider:  Haim Izquierdo MD    Collected:           05/27/2022 0703              Ordering Location:     [facility]        Received:            05/27/2022 [facility]                                                     Pathologist:           Liss Almanzar MD                                                                Specimens:   A) - Colon, recto-sigmoid, r/o colitis                                                              B) - Colon, rectum, r/o colitis                                                            Final Diagnosis 05/27/2022                      Value: This result contains rich text formatting which cannot be displayed here   Additional Information 05/27/2022                      Value: This result contains rich text formatting which cannot be displayed here  Rachel Lent Description 05/27/2022                      Value: This result contains rich text formatting which cannot be displayed here         Lab Results   Component Value Date    WBC 9 55 05/05/2022    HGB 14 2 05/05/2022    HCT 43 4 05/05/2022    MCV 83 05/05/2022     05/05/2022       Lab Results   Component Value Date    SODIUM 135 (L) 05/05/2022    K 3 7 05/05/2022     05/05/2022    CO2 29 05/05/2022    AGAP 3 (L) 05/05/2022    BUN 12 05/05/2022    CREATININE 1 04 05/05/2022    GLUF 81 05/05/2022    CALCIUM 9 7 05/05/2022    AST 16 05/05/2022    ALT 25 05/05/2022    ALKPHOS 76 05/05/2022    TP 8 0 05/05/2022    TBILI 0 88 05/05/2022    EGFR 102 05/05/2022       Lab Results   Component Value Date    CRP 6 7 (H) 05/05/2022       No results found for: [id number], TSH    No results found for: IRON, TIBC, [facility]    Radiology Results:   No results found

## 2022-09-21 NOTE — PATIENT INSTRUCTIONS
Repeat inflammatory markers (CRP and fecal calprotectin)  Continue IBGuard  Continue Low FODMAP diet but try to eat 5 smaller meals per day (instead of a traditional 3 meals per day)  Drink at least 8 cups of water per day  SIBO testing for bacterial overgrowth  Over the counter Preparation H suppositories and cream if bleeding returns

## 2022-10-07 ENCOUNTER — APPOINTMENT (OUTPATIENT)
Dept: LAB | Facility: MEDICAL CENTER | Age: 22
End: 2022-10-07
Attending: INTERNAL MEDICINE
Payer: COMMERCIAL

## 2022-10-07 DIAGNOSIS — K52.9 COLITIS: ICD-10-CM

## 2022-10-07 DIAGNOSIS — K62.5 RECTAL BLEEDING: ICD-10-CM

## 2022-10-07 DIAGNOSIS — R10.30 LOWER ABDOMINAL PAIN: ICD-10-CM

## 2022-10-07 LAB — CRP SERPL QL: 5.8 MG/L

## 2022-10-07 PROCEDURE — 86140 C-REACTIVE PROTEIN: CPT

## 2022-10-07 PROCEDURE — 36415 COLL VENOUS BLD VENIPUNCTURE: CPT

## 2022-10-10 ENCOUNTER — APPOINTMENT (OUTPATIENT)
Dept: LAB | Facility: MEDICAL CENTER | Age: 22
End: 2022-10-10
Attending: INTERNAL MEDICINE
Payer: COMMERCIAL

## 2022-10-10 DIAGNOSIS — R10.30 LOWER ABDOMINAL PAIN: ICD-10-CM

## 2022-10-10 DIAGNOSIS — K62.5 RECTAL BLEEDING: ICD-10-CM

## 2022-10-10 DIAGNOSIS — K52.9 COLITIS: ICD-10-CM

## 2022-10-10 PROCEDURE — 83993 ASSAY FOR CALPROTECTIN FECAL: CPT

## 2022-10-17 LAB — CALPROTECTIN STL-MCNT: 60 UG/G (ref 0–120)

## 2022-11-23 ENCOUNTER — TELEPHONE (OUTPATIENT)
Dept: GASTROENTEROLOGY | Facility: CLINIC | Age: 22
End: 2022-11-23

## 2022-11-23 NOTE — TELEPHONE ENCOUNTER
Received message from patient to schedule office visit with Dr Walt Nam  Left message for patient to call to schedule  Back line phone number given

## 2022-12-07 ENCOUNTER — OFFICE VISIT (OUTPATIENT)
Dept: FAMILY MEDICINE CLINIC | Facility: CLINIC | Age: 22
End: 2022-12-07

## 2022-12-07 VITALS
BODY MASS INDEX: 25.09 KG/M2 | WEIGHT: 169.4 LBS | OXYGEN SATURATION: 97 % | TEMPERATURE: 97.7 F | DIASTOLIC BLOOD PRESSURE: 80 MMHG | HEART RATE: 86 BPM | HEIGHT: 69 IN | SYSTOLIC BLOOD PRESSURE: 118 MMHG

## 2022-12-07 DIAGNOSIS — Z11.59 NEED FOR HEPATITIS C SCREENING TEST: ICD-10-CM

## 2022-12-07 DIAGNOSIS — Z00.00 ANNUAL PHYSICAL EXAM: Primary | ICD-10-CM

## 2022-12-07 DIAGNOSIS — Z11.3 SCREEN FOR STD (SEXUALLY TRANSMITTED DISEASE): ICD-10-CM

## 2022-12-07 DIAGNOSIS — J30.89 NON-SEASONAL ALLERGIC RHINITIS DUE TO OTHER ALLERGIC TRIGGER: ICD-10-CM

## 2022-12-07 DIAGNOSIS — E55.9 VITAMIN D DEFICIENCY: ICD-10-CM

## 2022-12-07 RX ORDER — FLUTICASONE PROPIONATE 50 MCG
2 SPRAY, SUSPENSION (ML) NASAL DAILY
Qty: 16 G | Refills: 1 | Status: SHIPPED | OUTPATIENT
Start: 2022-12-07

## 2022-12-07 RX ORDER — CETIRIZINE HYDROCHLORIDE 10 MG/1
10 TABLET ORAL DAILY
Qty: 30 TABLET | Refills: 1 | Status: SHIPPED | OUTPATIENT
Start: 2022-12-07

## 2022-12-07 NOTE — PROGRESS NOTES
Liset Bjearano is a 25 y o   male and is here for routine health maintenance  The patient reports problems -  with allergies  He is see GI for work up on bowel issues  History of Present Illness     Presents today for an annual physical and complaints of worsening allergy symptoms  Been taking Allegra with no improvement in his nasal congestion, runny nose and sneezing  Discussed stopping Allegra and adding cetirizine and fluticasone to see if we can improve symptoms      Well Adult Physical   Patient here for a comprehensive physical exam       Diet and Physical Activity  Diet: low FOBMAP diet per GI  Weight concerns: Patient is overweight (BMI 25 0-29  9)  Exercise: occasionally      Depression Screen  PHQ-2/9 Depression Screening    Little interest or pleasure in doing things: 0 - not at all  Feeling down, depressed, or hopeless: 0 - not at all  PHQ-2 Score: 0  PHQ-2 Interpretation: Negative depression screen          General Health  Hearing: Normal:  bilateral  Vision: no vision problems and most recent eye exam <1 year  Dental: regular dental visits, brushes teeth twice daily and flosses teeth occasionally      Cancer Screening  Colononoscopy 2022  PSA N/A    Smoker Quit over 1 year ago   Annual screening with low-dose helical computed tomography (CT) for patients age 54 to 76 years with history of smoking at least 30 pack-years and, if a former smoker, had quit within the previous 15 years      The following portions of the patient's history were reviewed and updated as appropriate: allergies, current medications, past family history, past medical history, past social history, past surgical history and problem list     Review of Systems     Review of Systems   Constitutional: Positive for fatigue  Negative for chills and fever  HENT: Positive for rhinorrhea  Negative for congestion, ear pain, postnasal drip, sinus pressure and sore throat      Eyes: Negative for pain and visual disturbance  Respiratory: Negative for cough, shortness of breath and wheezing  Cardiovascular: Negative for chest pain, palpitations and leg swelling  Gastrointestinal: Positive for abdominal pain, blood in stool, diarrhea and nausea  Negative for constipation and vomiting  Endocrine: Negative for cold intolerance, heat intolerance, polydipsia, polyphagia and polyuria  Genitourinary: Negative for dysuria, flank pain, frequency, penile discharge, scrotal swelling, testicular pain and urgency  Musculoskeletal: Negative for arthralgias, back pain and gait problem  Skin: Negative for rash  Allergic/Immunologic: Positive for environmental allergies (seasonal)  Negative for food allergies  Neurological: Negative for dizziness and headaches  Hematological: Does not bruise/bleed easily  Psychiatric/Behavioral: Negative for dysphoric mood  The patient is not nervous/anxious  Past Medical History     Past Medical History:   Diagnosis Date   • Acne    • History of bilateral inguinal hernias        Past Surgical History     Past Surgical History:   Procedure Laterality Date   • COLONOSCOPY     • EGD     • INGUINAL HERNIA REPAIR  2000   • WISDOM TOOTH EXTRACTION         Social History     Social History     Socioeconomic History   • Marital status: Single     Spouse name: None   • Number of children: None   • Years of education: None   • Highest education level: None   Occupational History   • None   Tobacco Use   • Smoking status: Never   • Smokeless tobacco: Former   Vaping Use   • Vaping Use: Never used   Substance and Sexual Activity   • Alcohol use:  Yes     Alcohol/week: 3 0 standard drinks     Types: 1 Glasses of wine, 1 Cans of beer, 1 Shots of liquor per week     Comment: not recently   • Drug use: Not Currently     Types: Marijuana   • Sexual activity: Not Currently     Partners: Female     Birth control/protection: Condom Male   Other Topics Concern   • None   Social History Narrative Attends [employer], grade 12, B's and C's     Social Determinants of Health     Financial Resource Strain: Not on file   Food Insecurity: Not on file   Transportation Needs: Not on file   Physical Activity: Not on file   Stress: Not on file   Social Connections: Not on file   Intimate Partner Violence: Not on file   Housing Stability: Not on file       Family History     Family History   Problem Relation Age of Onset   • No Known Problems Mother    • No Known Problems Father    • No Known Problems Maternal Grandmother    • No Known Problems Maternal Grandfather    • No Known Problems Paternal Grandmother    • No Known Problems Paternal Grandfather        Current Medications       Current Outpatient Medications:   •  cetirizine (ZyrTEC) 10 mg tablet, Take 1 tablet (10 mg total) by mouth daily, Disp: 30 tablet, Rfl: 1  •  fluticasone (FLONASE) 50 mcg/act nasal spray, 2 sprays into each nostril daily, Disp: 16 g, Rfl: 1     Allergies     No Known Allergies    Objective     /80 (BP Location: Left arm, Patient Position: Sitting, Cuff Size: Adult)   Pulse 86   Temp 97 7 °F (36 5 °C) (Temporal)   Ht 5' 9" (1 753 m)   Wt 76 8 kg (169 lb 6 4 oz)   SpO2 97%   BMI 25 02 kg/m²      Physical Exam  Vitals reviewed  Constitutional:       Appearance: Normal appearance  He is well-developed and overweight  HENT:      Head: Normocephalic  Right Ear: Tympanic membrane, ear canal and external ear normal       Left Ear: Tympanic membrane, ear canal and external ear normal       Nose: Nose normal       Mouth/Throat:      Mouth: Mucous membranes are moist       Pharynx: Oropharynx is clear  Eyes:      Extraocular Movements: Extraocular movements intact  Conjunctiva/sclera: Conjunctivae normal       Pupils: Pupils are equal, round, and reactive to light  Neck:      Thyroid: No thyromegaly  Cardiovascular:      Rate and Rhythm: Normal rate and regular rhythm  Pulses: Normal pulses        Heart sounds: Normal heart sounds  Pulmonary:      Effort: Pulmonary effort is normal       Breath sounds: Normal breath sounds  Abdominal:      General: Bowel sounds are normal  There is no distension  Palpations: Abdomen is soft  There is no mass  Tenderness: There is no abdominal tenderness  There is no right CVA tenderness, left CVA tenderness, guarding or rebound  Hernia: No hernia is present  Musculoskeletal:         General: Normal range of motion  Cervical back: Normal range of motion and neck supple  Lymphadenopathy:      Cervical: No cervical adenopathy  Skin:     General: Skin is warm and dry  Capillary Refill: Capillary refill takes less than 2 seconds  Neurological:      Mental Status: He is alert and oriented to person, place, and time  Deep Tendon Reflexes: Reflexes are normal and symmetric  Psychiatric:         Mood and Affect: Mood normal          Behavior: Behavior normal            No results found      Health Maintenance     Health Maintenance   Topic Date Due   • Hepatitis C Screening  Never done   • Hepatitis B Vaccine (1 of 3 - 3-dose series) 01/07/2023 (Originally 2000)   • COVID-19 Vaccine (1) 03/08/2023 (Originally 2/19/2001)   • Influenza Vaccine (1) 06/30/2023 (Originally 9/1/2022)   • HPV Vaccine (1 - Male 2-dose series) 12/07/2023 (Originally 8/19/2011)   • Depression Screening  12/07/2023   • BMI: Followup Plan  12/07/2023   • BMI: Adult  12/07/2023   • Annual Physical  12/07/2023   • DTaP,Tdap,and Td Vaccines (2 - Td or Tdap) 07/12/2026   • HIV Screening  Completed   • Pneumococcal Vaccine: Pediatrics (0 to 5 Years) and At-Risk Patients (6 to 59 Years)  Aged Out   • HIB Vaccine  Aged Out   • IPV Vaccine  Aged Out   • Hepatitis A Vaccine  Aged Out   • Meningococcal ACWY Vaccine  Aged [employer] History   Administered Date(s) Administered   • Tdap 07/12/2016   • Tuberculin Skin Test-PPD Intradermal 02/05/2020       Laboratory Results: Lab Results   Component Value Date    WBC 9 55 05/05/2022    WBC 8 26 12/23/2021    WBC 10 26 (H) 04/01/2021    HGB 14 2 05/05/2022    HGB 15 1 12/23/2021    HGB 14 8 04/01/2021    HCT 43 4 05/05/2022    HCT 46 9 12/23/2021    HCT 47 3 04/01/2021    MCV 83 05/05/2022    MCV 86 12/23/2021    MCV 87 04/01/2021     05/05/2022     12/23/2021     04/01/2021     Lab Results   Component Value Date    BUN 12 05/05/2022    BUN 11 12/23/2021    BUN 7 04/01/2021     Lab Results   Component Value Date    ALT 25 05/05/2022    ALT 31 12/23/2021    ALT 20 04/01/2021    AST 16 05/05/2022    AST 17 12/23/2021    AST 11 04/01/2021     No results found for: TSH  No results found for: HGBA1C    Lipid Profile:   No results found for: CHOL  Lab Results   Component Value Date    HDL 52 04/01/2021    HDL 46 02/05/2020     No results found for: LDLC  Lab Results   Component Value Date    LDLCALC 50 04/01/2021    LDLCALC 74 02/05/2020     Lab Results   Component Value Date    TRIG 43 04/01/2021    TRIG 38 02/05/2020       Assessment/Plan     1  Annual physical exam  -     CBC and differential; Future  -     Comprehensive metabolic panel; Future  -     Lipid panel; Future    2  Non-seasonal allergic rhinitis due to other allergic trigger  -     cetirizine (ZyrTEC) 10 mg tablet; Take 1 tablet (10 mg total) by mouth daily  -     fluticasone (FLONASE) 50 mcg/act nasal spray; 2 sprays into each nostril daily    3  BMI 25 0-25 9,adult    4  Vitamin D deficiency  -     Vitamin D 25 hydroxy; Future    5  Screen for STD (sexually transmitted disease)  -     HIV 1/2 Antigen/Antibody (4th Generation) w Reflex [facility]; Future  -     RPR; Future  -     Chlamydia/GC amplified DNA by PCR; Future    6  Need for hepatitis C screening test  -     Hepatitis C Antibody ([facility]); Future          1   Healthy male exam   2  Patient Counseling:   · Nutrition: Stressed importance of a well balanced diet, moderation of sodium/saturated fat, caloric balance and sufficient intake of fiber  · Exercise: Stressed the importance of regular exercise with a goal of 150 minutes per week  · Dental Health: Discussed daily flossing and brushing and regular dental visits   · Sexuality: Discussed sexually transmitted infections, use of condoms and prevention of unintended pregnancy  · Alcohol Use:  Recommended moderation of alcohol intake  · Injury Prevention: Discussed Safety Belts, Safety Helmets, and Smoke Detectors    · Immunizations reviewed  Yes  · Discussed benefits of screening Yes  · Discussed the patient's BMI with him  The BMI is above average; BMI management plan is completed  3  Cancer Screening Up to date  4  Labs  ordered  5  Follow up next physical in 1 year  DIANA Herrera     BMI Counseling: Body mass index is 25 02 kg/m²  The BMI is above normal  Nutrition recommendations include reducing portion sizes, decreasing overall calorie intake, 3-5 servings of fruits/vegetables daily, reducing fast food intake, consuming healthier snacks, decreasing soda and/or juice intake, moderation in carbohydrate intake and increasing intake of lean protein  Exercise recommendations include exercising 3-5 times per week

## 2022-12-07 NOTE — PATIENT INSTRUCTIONS
Weight Management   AMBULATORY CARE:   Why it is important to manage your weight:  Being overweight increases your risk of health conditions such as heart disease, high blood pressure, type 2 diabetes, and certain types of cancer  It can also increase your risk for osteoarthritis, sleep apnea, and other respiratory problems  Aim for a slow, steady weight loss  Even a small amount of weight loss can lower your risk of health problems  Risks of being overweight:  Extra weight can cause many health problems, including the following:  · Diabetes (high blood sugar level)    · High blood pressure or high cholesterol    · Heart disease    · Stroke    · Gallbladder or liver disease    · Cancer of the colon, breast, prostate, liver, or kidney    · Sleep apnea    · Arthritis or gout    Screening  is done to check for health conditions before you have signs or symptoms  If you are 28to 79years old, your blood sugar level may be checked every 3 years for signs of prediabetes or diabetes  Your healthcare provider will check your blood pressure at each visit  High blood pressure can lead to a stroke or other problems  Your provider may check for signs of heart disease, cancer, or other health problems  How to lose weight safely:  A safe and healthy way to lose weight is to eat fewer calories and get regular exercise  · You can lose up about 1 pound a week by decreasing the number of calories you eat by 500 calories each day  You can decrease calories by eating smaller portion sizes or by cutting out high-calorie foods  Read labels to find out how many calories are in the foods you eat  · You can also burn calories with exercise such as walking, swimming, or biking  You will be more likely to keep weight off if you make these changes part of your lifestyle  Exercise at least 30 minutes per day on most days of the week   You can also fit in more physical activity by taking the stairs instead of the elevator or parking farther away from stores  Ask your healthcare provider about the best exercise plan for you  Healthy meal plan for weight management:  A healthy meal plan includes a variety of foods, contains fewer calories, and helps you stay healthy  A healthy meal plan includes the following:     · Eat whole-grain foods more often  A healthy meal plan should contain fiber  Fiber is the part of grains, fruits, and vegetables that is not broken down by your body  Whole-grain foods are healthy and provide extra fiber in your diet  Some examples of whole-grain foods are whole-wheat breads and pastas, oatmeal, brown rice, and bulgur  · Eat a variety of vegetables every day  Include dark, leafy greens such as spinach, kale, clay greens, and mustard greens  Eat yellow and orange vegetables such as carrots, sweet potatoes, and winter squash  · Eat a variety of fruits every day  Choose fresh or canned fruit (canned in its own juice or light syrup) instead of juice  Fruit juice has very little or no fiber  · Eat low-fat dairy foods  Drink fat-free (skim) milk or 1% milk  Eat fat-free yogurt and low-fat cottage cheese  Try low-fat cheeses such as mozzarella and other reduced-fat cheeses  · Choose meat and other protein foods that are low in fat  Choose beans or other legumes such as split peas or lentils  Choose fish, skinless poultry (chicken or turkey), or lean cuts of red meat (beef or pork)  Before you cook meat or poultry, cut off any visible fat  · Use less fat and oil  Try baking foods instead of frying them  Add less fat, such as margarine, sour cream, regular salad dressing and mayonnaise to foods  Eat fewer high-fat foods  Some examples of high-fat foods include french fries, doughnuts, ice cream, and cakes  · Eat fewer sweets  Limit foods and drinks that are high in sugar  This includes candy, cookies, regular soda, and sweetened drinks  Ways to decrease calories:   · Eat smaller portions  ? Use a small plate with smaller servings  ? Do not eat second helpings  ? When you eat at a restaurant, ask for a box and place half of your meal in the box before you eat  ? Share an entrée with someone else  · Replace high-calorie snacks with healthy, low-calorie snacks  ? Choose fresh fruit, vegetables, fat-free rice cakes, or air-popped popcorn instead of potato chips, nuts, or chocolate  ? Choose water or calorie-free drinks instead of soda or sweetened drinks  · Do not shop for groceries when you are hungry  You may be more likely to make unhealthy food choices  Take a grocery list of healthy foods and shop after you have eaten  · Eat regular meals  Do not skip meals  Skipping meals can lead to overeating later in the day  This can make it harder for you to lose weight  Eat a healthy snack in place of a meal if you do not have time to eat a regular meal  Talk with a dietitian to help you create a meal plan and schedule that is right for you  Other things to consider as you try to lose weight:   · Be aware of situations that may give you the urge to overeat, such as eating while watching television  Find ways to avoid these situations  For example, read a book, go for a walk, or do crafts  · Meet with a weight loss support group or friends who are also trying to lose weight  This may help you stay motivated to continue working on your weight loss goals  © Copyright [employer] 2022 Information is for End User's use only and may not be sold, redistributed or otherwise used for commercial purposes  All illustrations and images included in CareNotes® are the copyrighted property of A D A M , Inc  or [facility] Rosemary Casas   The above information is an  only  It is not intended as medical advice for individual conditions or treatments  Talk to your doctor, nurse or pharmacist before following any medical regimen to see if it is safe and effective for you

## 2022-12-09 ENCOUNTER — OFFICE VISIT (OUTPATIENT)
Dept: GASTROENTEROLOGY | Facility: CLINIC | Age: 22
End: 2022-12-09

## 2022-12-09 VITALS
HEART RATE: 86 BPM | OXYGEN SATURATION: 98 % | WEIGHT: 171 LBS | BODY MASS INDEX: 25.33 KG/M2 | HEIGHT: 69 IN | TEMPERATURE: 97.9 F | SYSTOLIC BLOOD PRESSURE: 111 MMHG | DIASTOLIC BLOOD PRESSURE: 67 MMHG

## 2022-12-09 DIAGNOSIS — K62.5 RECTAL BLEEDING: Primary | ICD-10-CM

## 2022-12-09 DIAGNOSIS — R19.7 DIARRHEA, UNSPECIFIED TYPE: ICD-10-CM

## 2022-12-09 DIAGNOSIS — R14.0 BLOATING: ICD-10-CM

## 2022-12-09 DIAGNOSIS — K52.9 COLITIS: ICD-10-CM

## 2022-12-09 DIAGNOSIS — R10.30 LOWER ABDOMINAL PAIN: ICD-10-CM

## 2022-12-09 RX ORDER — MESALAMINE 1000 MG/1
1000 SUPPOSITORY RECTAL
Qty: 30 SUPPOSITORY | Refills: 2 | Status: SHIPPED | OUTPATIENT
Start: 2022-12-09

## 2022-12-09 RX ORDER — FAMOTIDINE 40 MG/1
40 TABLET, FILM COATED ORAL
Qty: 30 TABLET | Refills: 3 | Status: SHIPPED | OUTPATIENT
Start: 2022-12-09

## 2022-12-09 NOTE — PROGRESS NOTES
[facility] - Outpatient Follow-up Note  Wes Cedillo 25 y o  male MRN: [id number]  Encounter: [id number]          ASSESSMENT AND PLAN:    Wes Cedillo is a 25 y o  male with intermittent rectal bleeding, abdominal pain, loose stool/diarrhea who presents for follow-up  In the past he has had some improvement with diet as well as peppermint oil but when he was last seen in September he continued to have the same symptoms  He was feeling better after last visit off medication but then he began to have blood in his stool, mixed in, as well as bloating  Colonoscopy from January showed rectal erythema biopsies noted some focal active cryptitis in the rectum but overall was normal   Flexible sigmoidoscopy from May with erythema in the rectum but biopsies were normal   Prior endoscopy with small hiatal hernia and some gastric erythema and biopsies showed patchy intraepithelial lymphocytes as well as some inactive gastritis  MR enterography from April was normal     CMP from May with sodium of 135 but otherwise normal   CBC normal   CRP was 6 7 in May and then 5 8 in October  C  difficile previously was negative  Giardia negative  Calprotectin was less than 16 in May and then 60 and October  Giardia negative previously  Prior elastase, fecal fats normal   Celiac antibodies back in February were negative  1  Rectal bleeding    2  Colitis    3  Lower abdominal pain    4  Bloating    5  Diarrhea, unspecified type        Orders Placed This Encounter   Procedures   • Small intestinal bacterial overgrowth   • Calprotectin,Fecal   • C-reactive protein       Repeat inflammatory marker in the blood and stool  Depending on what this shows we may consider a repeat flexible sigmoidoscopy  After you give the sample you can start the canasa suppository each night  You can start taking pepcid right before bedtime     Keep a food and symptoms journal   Drink at least 8 cups of water per day  Complete SIBO breath test to look for bacterial overgrowth  Consider gastric emptying study    ______________________________________________________________________    SUBJECTIVE:    Jordan Mireles is a 25 y o  male who presents with complaint of rectal bleeding  Since last visit he was feeling better  He was not on medication and doing well  His stomach did not feel great  About 1 week ago he had constant blood in the stool  Both in the bowl and when he wiped  He had some clots  He had a little burning  No straining  Usually [place] 4-5    + bloating  Comes and goes  Usually in the morning and before eating and then it goes away  Sometimes food related but not always  Weight around the same  No heartburn, dysphagia, odynophagia  + nausea in the morning, but no vomiting  Some acid reflux  He dry heaved  + dull abdominal pain near the umbilicus and it can be at any time  Answers for HPI/ROS submitted by the patient on 12/8/2022  Chronicity: chronic  Onset: more than 1 year ago  Onset quality: gradual  Frequency: constantly  Episode duration: 1 Weeks  Progression since onset: waxing and waning  Pain location: epigastric region  Pain - numeric: 4/10  Pain quality: aching, cramping, dull, a sensation of fullness  Radiates to: epigastric region, periumbilical region  anorexia: Yes  arthralgias: Yes  belching: No  constipation: No  diarrhea: Yes  dysuria: No  fever: No  flatus: No  frequency: No  hematochezia: Yes  hematuria: No  melena: No  myalgias: Yes  nausea: Yes  weight loss: No  Aggravated by: certain positions, drinking alcohol  Relieved by: bowel movements, passing flatus, recumbency  Diagnostic workup: lower endoscopy        REVIEW OF SYSTEMS IS OTHERWISE NEGATIVE    10 point ROS reviewed and negative, except as above      Historical Information   Past Medical History:   Diagnosis Date   • Acne    • History of bilateral inguinal hernias      Past Surgical History:   Procedure Laterality Date • COLONOSCOPY     • EGD     • INGUINAL HERNIA REPAIR  2000   • UPPER GASTROINTESTINAL ENDOSCOPY  1/18/2022   • WISDOM TOOTH EXTRACTION       Social History   Social History     Substance and Sexual Activity   Alcohol Use Yes   • Alcohol/week: 3 0 standard drinks   • Types: 1 Glasses of wine, 1 Cans of beer, 1 Shots of liquor per week    Comment: not recently     Social History     Substance and Sexual Activity   Drug Use Yes   • Types: Marijuana     Social History     Tobacco Use   Smoking Status Never   Smokeless Tobacco Never     Family History   Problem Relation Age of Onset   • No Known Problems Mother    • No Known Problems Father    • Celiac disease Maternal Grandmother    • No Known Problems Maternal Grandfather    • No Known Problems Paternal Grandmother    • No Known Problems Paternal Grandfather    • Celiac disease Maternal Uncle    • Crohn's disease Brother    • Ulcerative colitis Brother        Meds/Allergies       Current Outpatient Medications:   •  cetirizine (ZyrTEC) 10 mg tablet  •  famotidine (PEPCID) 40 MG tablet  •  fluticasone (FLONASE) 50 mcg/act nasal spray  •  mesalamine (CANASA) 1,000 mg suppository    No Known Allergies        Objective     Blood pressure 111/67, pulse 86, temperature 97 9 °F (36 6 °C), temperature source Tympanic, height 5' 9" (1 753 m), weight 77 6 kg (171 lb), SpO2 98 %  Body mass index is 25 25 kg/m²  PHYSICAL EXAMINATION:    General Appearance:   Alert, cooperative, no distress   HEENT:  Normocephalic, atraumatic, anicteric  Neck supple, symmetrical, trachea midline  Lungs:   Equal chest rise and unlabored breathing, normal effort, no coughing  Cardiovascular:   No visualized JVD  Abdomen:   No abdominal distension  Skin:   No jaundice, rashes, or lesions  Musculoskeletal:   Normal range of motion visualized  Psych:  Normal affect and normal insight  Neuro:  Alert and appropriate           Lab Results:   No visits with results within 1 Day(s) from this visit  Latest known visit with results is:   Appointment on 10/10/2022   Component Date Value   • Calprotectin 10/10/2022 60        Lab Results   Component Value Date    WBC 9 55 05/05/2022    HGB 14 2 05/05/2022    HCT 43 4 05/05/2022    MCV 83 05/05/2022     05/05/2022       Lab Results   Component Value Date    SODIUM 135 (L) 05/05/2022    K 3 7 05/05/2022     05/05/2022    CO2 29 05/05/2022    AGAP 3 (L) 05/05/2022    BUN 12 05/05/2022    CREATININE 1 04 05/05/2022    GLUF 81 05/05/2022    CALCIUM 9 7 05/05/2022    AST 16 05/05/2022    ALT 25 05/05/2022    ALKPHOS 76 05/05/2022    TP 8 0 05/05/2022    TBILI 0 88 05/05/2022    EGFR 102 05/05/2022       Lab Results   Component Value Date    CRP 5 8 (H) 10/07/2022       No results found for: [id number], TSH    No results found for: IRON, TIBC, FERRITIN    Radiology Results:   No results found

## 2022-12-09 NOTE — PATIENT INSTRUCTIONS
Repeat inflammatory marker in the blood and stool  Depending on what this shows we may consider a repeat flexible sigmoidoscopy  After you give the sample you can start the canasa suppository each night  You can start taking pepcid right before bedtime     Keep a food and symptoms journal   Drink at least 8 cups of water per day  Complete SIBO breath test to look for bacterial overgrowth

## 2022-12-29 DIAGNOSIS — J30.89 NON-SEASONAL ALLERGIC RHINITIS DUE TO OTHER ALLERGIC TRIGGER: ICD-10-CM

## 2022-12-29 RX ORDER — CETIRIZINE HYDROCHLORIDE 10 MG/1
TABLET ORAL
Qty: 90 TABLET | Refills: 1 | Status: SHIPPED | OUTPATIENT
Start: 2022-12-29

## 2023-03-23 ENCOUNTER — TELEPHONE (OUTPATIENT)
Dept: GASTROENTEROLOGY | Facility: CLINIC | Age: 23
End: 2023-03-23

## 2023-03-27 ENCOUNTER — OFFICE VISIT (OUTPATIENT)
Dept: GASTROENTEROLOGY | Facility: CLINIC | Age: 23
End: 2023-03-27

## 2023-03-27 VITALS
SYSTOLIC BLOOD PRESSURE: 119 MMHG | DIASTOLIC BLOOD PRESSURE: 74 MMHG | HEART RATE: 84 BPM | BODY MASS INDEX: 23.88 KG/M2 | HEIGHT: 70 IN | TEMPERATURE: 97.8 F | OXYGEN SATURATION: 99 % | WEIGHT: 166.8 LBS

## 2023-03-27 DIAGNOSIS — R14.0 BLOATING: ICD-10-CM

## 2023-03-27 DIAGNOSIS — K62.5 RECTAL BLEEDING: ICD-10-CM

## 2023-03-27 DIAGNOSIS — R10.30 LOWER ABDOMINAL PAIN: Primary | ICD-10-CM

## 2023-03-27 NOTE — PROGRESS NOTES
[facility] - Outpatient Follow-up Note  Italo Domran 25 y o  male MRN: [id number]  Encounter: [id number]          ASSESSMENT AND PLAN:    Italo Dorman is a 25 y o  male with intermittent rectal bleeding, abdominal pain, loose stool who presents for follow-up  Since last visit his stools are overall better and no rectal bleeding, but he has some general abdominal pain and he had a recent episode of severe pain (but that was unusual for him)  Colonoscopy from January 2022 with rectal erythema and biopsies with some focal active cryptitis in the rectum but overall normal   Flexible sigmoidoscopy from May 2022 with erythema in the rectum and biopsies normal   Prior endoscopy with small hiatal hernia and gastric erythema and biopsy showed patchy intraepithelial lymphocytes and some inactive gastritis  MR enterography from April was normal  Prior stool test including C  difficile, Giardia, calprotectin normal   Prior elastase and fecal fat is normal   Prior celiac blood work negative  Calprotectin previously less than 16 and then in October 60  Most recent CRP 5 7 which was normal     1  Lower abdominal pain    2  Bloating    3  Rectal bleeding        No orders of the defined types were placed in this encounter  Awaiting results from SIBO testing  Can repeat inflammatory markers, specifically calprotectin in the future if rectal bleeding returns  Drink at least 8 cups of water per day  Consider gastric emptying study in the future  Consider trial of amitriptyline in the future    ______________________________________________________________________    SUBJECTIVE:    Italo Dorman is a 25 y o  male who presents with complaint of abdominal pain  He is feeling better  One day he ate a bagel  He knew he had to go but just pain  He had to move and he was afraid it would hurt more  It was under the umbilicus  He went and it was gone  It was 1 time, 1-2 weeks ago   He is having less pain  No blood in a long time  He thinks the mesalamine helped  The stools are formed  He still has some bloating but a little  Weight has been stable  Answers for HPI/ROS submitted by the patient on 3/26/2023  Chronicity: chronic  Onset: more than 1 year ago  Onset quality: gradual  Frequency: constantly  Episode duration: 1 Days  Progression since onset: gradually improving  Pain location: periumbilical region, suprapubic region  Pain - numeric: 3/10  Pain quality: aching, dull, a sensation of fullness, sharp  Radiates to: periumbilical region  anorexia: Yes  arthralgias: Yes  belching: Yes  constipation: No  diarrhea: No  dysuria: No  fever: No  flatus: Yes  frequency: No  headaches: No  hematochezia: No  hematuria: No  melena: No  myalgias: Yes  nausea: No  weight loss: No  vomiting: No  Aggravated by: certain positions, eating, movement  Relieved by: being still, certain positions, recumbency  Diagnostic workup: CT scan, lower endoscopy, upper endoscopy        REVIEW OF SYSTEMS IS OTHERWISE NEGATIVE    10 point ROS reviewed and negative, except as above      Historical Information   Past Medical History:   Diagnosis Date   • Acne    • History of bilateral inguinal hernias      Past Surgical History:   Procedure Laterality Date   • COLONOSCOPY     • EGD     • INGUINAL HERNIA REPAIR  2000   • UPPER GASTROINTESTINAL ENDOSCOPY  1/18/2022   • WISDOM TOOTH EXTRACTION       Social History   Social History     Substance and Sexual Activity   Alcohol Use Yes   • Alcohol/week: 3 0 standard drinks   • Types: 1 Glasses of wine, 1 Cans of beer, 1 Shots of liquor per week    Comment: not recently     Social History     Substance and Sexual Activity   Drug Use Yes   • Types: Marijuana     Social History     Tobacco Use   Smoking Status Never   Smokeless Tobacco Never     Family History   Problem Relation Age of Onset   • No Known Problems Mother    • No Known Problems Father    • Celiac disease Maternal Grandmother "  • No Known Problems Maternal Grandfather    • No Known Problems Paternal Grandmother    • No Known Problems Paternal Grandfather    • Celiac disease Maternal Uncle    • Crohn's disease Brother    • Ulcerative colitis Brother        Meds/Allergies     No current outpatient medications on file  No Known Allergies        Objective     Blood pressure 119/74, pulse 84, temperature 97 8 °F (36 6 °C), temperature source Tympanic, height 5' 10\" (1 778 m), weight 75 7 kg (166 lb 12 8 oz), SpO2 99 %  Body mass index is 23 93 kg/m²  PHYSICAL EXAMINATION:    General Appearance:   Alert, cooperative, no distress   HEENT:  Normocephalic, atraumatic, anicteric  Neck supple, symmetrical, trachea midline  Lungs:   Equal chest rise and unlabored breathing, normal effort, no coughing  Cardiovascular:   No visualized JVD  Abdomen:   No abdominal distension  Skin:   No jaundice, rashes, or lesions  Musculoskeletal:   Normal range of motion visualized  Psych:  Normal affect and normal insight  Neuro:  Alert and appropriate  Lab Results:   No visits with results within 1 Day(s) from this visit     Latest known visit with results is:   Appointment on 10/10/2022   Component Date Value   • Calprotectin 10/10/2022 60        Lab Results   Component Value Date    WBC 9 55 05/05/2022    HGB 14 2 05/05/2022    HCT 43 4 05/05/2022    MCV 83 05/05/2022     05/05/2022       Lab Results   Component Value Date    SODIUM 135 (L) 05/05/2022    K 3 7 05/05/2022     05/05/2022    CO2 29 05/05/2022    AGAP 3 (L) 05/05/2022    BUN 12 05/05/2022    CREATININE 1 04 05/05/2022    GLUF 81 05/05/2022    CALCIUM 9 7 05/05/2022    AST 16 05/05/2022    ALT 25 05/05/2022    ALKPHOS 76 05/05/2022    TP 8 0 05/05/2022    TBILI 0 88 05/05/2022    EGFR 102 05/05/2022       Lab Results   Component Value Date    CRP 5 8 (H) 10/07/2022       No results found for: [id number], TSH    No results found for: IRON, TIBC, " FERRITIN    Radiology Results:   No results found

## 2023-03-29 ENCOUNTER — OFFICE VISIT (OUTPATIENT)
Dept: GASTROENTEROLOGY | Facility: CLINIC | Age: 23
End: 2023-03-29

## 2023-03-29 DIAGNOSIS — R10.9 ABDOMINAL PAIN, UNSPECIFIED ABDOMINAL LOCATION: ICD-10-CM

## 2023-03-29 DIAGNOSIS — R14.0 BLOATING: Primary | ICD-10-CM

## 2023-03-29 NOTE — PROGRESS NOTES
[facility]       Bacterial Overgrowth Analytical Record    Vidhya Torres 25 y o  male MRN: [id number]      Date of Test: 03/22/23    Substrate Given: Lactulose    Ordering Provider: Dr Zhen Whitaker Assistant: Fallon Yusuf  Symptoms: bloating and Abd pain    The patient presents for bacterial overgrowth testing  Patient fasted overnight  Baseline readings obtained  Breath test performed every 20 min for a total of 3 hr    Sample Clock Time ppmH2 ppmCH4 Co2% Monik   Baseline   8:00am 2 2 5 4 1 01   #1  20 minutes 8:20am 5 4 4 7 1 17   #2  40 minutes 8:40am 4 3 5 0 1 10   #3  60 minutes 9:00am 6 4 5 3 1 03   #4  80 minutes 9:20am 22 7 5 2 1 05   #5  100 minutes 9:40am 46 9 4 4 1 25   #6  120 minutes 10:00am 77 11 4 6 1 19   #7  140 minutes 10:20am 100 13 4 8 1 14   #8  160 minutes 10:40am 88 11 4 5 1 22   #9  180 minutes 11:00am 147 16 4 7 1 17       Physician interpretation: 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

## 2023-03-30 ENCOUNTER — TELEPHONE (OUTPATIENT)
Dept: GASTROENTEROLOGY | Facility: CLINIC | Age: 23
End: 2023-03-30

## 2023-03-30 DIAGNOSIS — K63.89 SMALL INTESTINAL BACTERIAL OVERGROWTH (SIBO): Primary | ICD-10-CM

## 2023-03-30 DIAGNOSIS — K58.0 IRRITABLE BOWEL SYNDROME WITH DIARRHEA: ICD-10-CM

## 2023-03-30 RX ORDER — NEOMYCIN SULFATE 500 MG/1
500 TABLET ORAL 2 TIMES DAILY
Qty: 28 TABLET | Refills: 0 | Status: SHIPPED | OUTPATIENT
Start: 2023-03-30 | End: 2023-04-13

## 2023-03-30 NOTE — TELEPHONE ENCOUNTER
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

## 2023-03-30 NOTE — TELEPHONE ENCOUNTER
----- Message from Siddharth Deepthi sent at 3/30/2023  3:44 PM EDT -----  Regarding: FW: SIBO breath test  Contact: [phone]    ----- Message -----  From: Laure Mccullough  Sent: 3/30/2023   3:35 PM EDT  To: [facility]  Subject: SIBO breath test                                 Thank you for the response, would you please give me a call when you get a chance at [phone] thank you

## 2023-04-04 ENCOUNTER — TELEPHONE (OUTPATIENT)
Dept: GASTROENTEROLOGY | Facility: CLINIC | Age: 23
End: 2023-04-04

## 2023-04-04 NOTE — TELEPHONE ENCOUNTER
Completed PA for Xifaxin 550mg tab through covermymeds  PA pending determination      Jovanna Dave (Key: [id number]) - [id number]  Xifaxan 550MG tablets       Status: Sent to Plan  Created: March 30th, 6280 [phone]  Sent: April 4th, 2023

## 2023-04-05 ENCOUNTER — TELEPHONE (OUTPATIENT)
Dept: GASTROENTEROLOGY | Facility: CLINIC | Age: 23
End: 2023-04-05

## 2023-04-10 NOTE — TELEPHONE ENCOUNTER
Called [facility] to inquire about PA sent 6 days ago and states still pending   Spoke with Adenike and has informed me the request is currently still being reviewed with the clinical team  Per Adenike, changed priority from medium to high as I informed her pt needed to start treatment as soon as possible for positive SIBO test

## 2023-04-11 NOTE — TELEPHONE ENCOUNTER
I contacted [facility] to follow up on PA for Xifaxin, Per Julita no change in status; still waiting to be reviewed  A turnaround time could not be provided as they cannot estimate one, per Julita  She did assure the case had been changed to high priority and she will send a message to their clinical team for update

## 2023-04-13 NOTE — TELEPHONE ENCOUNTER
Received fax from [employer] denying Xifaxin 550mg tab  Reason for denial due to plan requiring adequate trial and failure to generic antibiotic therapies:  Trimethoprim-sulfamethoxazole, tetracycline, ciprofloxacin, etc  Documentation has to be provided of the trials and failures with description of intolerances  Message will be sent to patient and provider

## 2023-05-04 DIAGNOSIS — K63.89 SMALL INTESTINAL BACTERIAL OVERGROWTH (SIBO): Primary | ICD-10-CM

## 2023-05-04 RX ORDER — VANCOMYCIN HYDROCHLORIDE 125 MG/1
125 CAPSULE ORAL 4 TIMES DAILY
Qty: 40 CAPSULE | Refills: 0 | Status: SHIPPED | OUTPATIENT
Start: 2023-05-04 | End: 2023-05-14

## 2023-05-09 DIAGNOSIS — K58.0 IRRITABLE BOWEL SYNDROME WITH DIARRHEA: Primary | ICD-10-CM

## 2023-05-09 RX ORDER — AMITRIPTYLINE HYDROCHLORIDE 10 MG/1
10 TABLET, FILM COATED ORAL
Qty: 30 TABLET | Refills: 3 | Status: SHIPPED | OUTPATIENT
Start: 2023-05-09

## 2023-05-31 DIAGNOSIS — K58.0 IRRITABLE BOWEL SYNDROME WITH DIARRHEA: ICD-10-CM

## 2023-05-31 RX ORDER — AMITRIPTYLINE HYDROCHLORIDE 10 MG/1
TABLET, FILM COATED ORAL
Qty: 90 TABLET | Refills: 2 | Status: SHIPPED | OUTPATIENT
Start: 2023-05-31

## 2023-06-21 ENCOUNTER — TRANSCRIBE ORDERS (OUTPATIENT)
Dept: GASTROENTEROLOGY | Facility: CLINIC | Age: 23
End: 2023-06-21

## 2023-06-22 ENCOUNTER — TELEPHONE (OUTPATIENT)
Age: 23
End: 2023-06-22

## 2024-04-29 ENCOUNTER — OFFICE VISIT (OUTPATIENT)
Dept: URGENT CARE | Facility: MEDICAL CENTER | Age: 24
End: 2024-04-29
Payer: COMMERCIAL

## 2024-04-29 VITALS
HEART RATE: 84 BPM | TEMPERATURE: 97.6 F | DIASTOLIC BLOOD PRESSURE: 83 MMHG | RESPIRATION RATE: 18 BRPM | HEIGHT: 70 IN | OXYGEN SATURATION: 99 % | SYSTOLIC BLOOD PRESSURE: 129 MMHG | WEIGHT: 161.6 LBS | BODY MASS INDEX: 23.13 KG/M2

## 2024-04-29 DIAGNOSIS — S60.10XA SUBUNGUAL HEMATOMA OF DIGIT OF HAND, INITIAL ENCOUNTER: Primary | ICD-10-CM

## 2024-04-29 PROCEDURE — 99213 OFFICE O/P EST LOW 20 MIN: CPT | Performed by: FAMILY MEDICINE

## 2024-04-29 PROCEDURE — 10140 I&D HMTMA SEROMA/FLUID COLLJ: CPT | Performed by: FAMILY MEDICINE

## 2024-04-29 NOTE — PATIENT INSTRUCTIONS
Under sterile technique and under digital block.  I was able to drain subungual hematoma successfully.  Patient tolerated procedure well.  I applied bacitracin ointment followed by sterile Band-Aid.  Advised patient to keep it elevated, apply ice as needed.  Apply antibiotic ointment daily followed by Band-Aid.  He is to observe for any signs of wound infection.    Subungual Hematoma   WHAT YOU NEED TO KNOW:   A subungual hematoma is a collection of blood under your fingernail or toenail.  DISCHARGE INSTRUCTIONS:   Call your doctor if:   You have increased redness, swelling, or pain.    You notice pus or a bad smell coming from your nail.    You see red streaks on your finger or toe that starts from your nail.    Your nail falls off and there is bleeding.    You have questions or concerns about your condition or care.    Medicines:  You may need any of the following:  Acetaminophen  decreases pain and fever. It is available without a doctor's order. Ask how much to take and how often to take it. Follow directions. Read the labels of all other medicines you are using to see if they also contain acetaminophen, or ask your doctor or pharmacist. Acetaminophen can cause liver damage if not taken correctly.    NSAIDs , such as ibuprofen, help decrease swelling, pain, and fever. This medicine is available with or without a doctor's order. NSAIDs can cause stomach bleeding or kidney problems in certain people. If you take blood thinner medicine, always ask your healthcare provider if NSAIDs are safe for you. Always read the medicine label and follow directions.    Take your medicine as directed.  Contact your healthcare provider if you think your medicine is not helping or if you have side effects. Tell your provider if you are allergic to any medicine. Keep a list of the medicines, vitamins, and herbs you take. Include the amounts, and when and why you take them. Bring the list or the pill bottles to follow-up visits.  Carry your medicine list with you in case of an emergency.    Self-care:   Apply ice  on your finger or toe for 15 to 20 minutes every hour or as directed. Use an ice pack, or put crushed ice in a plastic bag. Cover it with a towel. Ice helps prevent tissue damage and decreases swelling and pain.    Elevate  your finger or toe above the level of your heart as often as you can. This will help decrease swelling and pain. Prop your finger or toe on pillows or blankets to keep it elevated comfortably.     Gently trim your nail  if it begins to fall off in pieces. This may decrease your risk for catching the nail on an object or ripping it off.         Wear comfortable shoes  that fit correctly to prevent more injury to your toe.    Follow up with your doctor as directed:  Write down your questions so you remember to ask them during your visits.  © Copyright [employer] 2023 Information is for End User's use only and may not be sold, redistributed or otherwise used for commercial purposes.  The above information is an  only. It is not intended as medical advice for individual conditions or treatments. Talk to your doctor, nurse or pharmacist before following any medical regimen to see if it is safe and effective for you.

## 2024-04-29 NOTE — PROGRESS NOTES
[facility]        NAME: Radha Mccrary is a 23 y.o. male  : 2000    MRN: [id number]  DATE: 2024  TIME: 9:32 AM    Assessment and Plan   Subungual hematoma of digit of hand, initial encounter [S60.10XA]  1. Subungual hematoma of digit of hand, initial encounter              Patient Instructions       Follow up with PCP in 3-5 days.  Proceed to  ER if symptoms worsen.    If tests have been performed at [facility], our office will contact you with results if changes need to be made to the care plan discussed with you at the visit.  [facility]    Chief Complaint     Chief Complaint   Patient presents with    Finger Pain     Pt states he jammed his right hand ring finger Friday.  He is here today to have it drilled out.           History of Present Illness       23-year-old male here today because he injured his right ring finger 2 days ago while working.  Patient was removing motorcycle engine when it slipped and crushed his right middle finger.  It caused bleeding underneath the nail.  He was able to control the bleeding.  He has applied pressure on it.  He has a lot of discomfort because of the hematoma.  He does not wish to get an x-ray.  Denies any fever or purulent drainage.        Review of Systems   Review of Systems   Skin:  Positive for wound.         Current Medications       Current Outpatient Medications:     amitriptyline (ELAVIL) 10 mg tablet, TAKE 1 TABLET BY MOUTH DAILY AT BEDTIME (Patient not taking: Reported on 2024), Disp: 90 tablet, Rfl: 2    Current Allergies     Allergies as of 2024    (No Known Allergies)            The following portions of the patient's history were reviewed and updated as appropriate: allergies, current medications, past family history, past medical history, past social history, past surgical history and problem list.     Past Medical History:   Diagnosis Date    Acne     History of bilateral  "inguinal hernias        Past Surgical History:   Procedure Laterality Date    COLONOSCOPY      EGD      INGUINAL HERNIA REPAIR  2000    UPPER GASTROINTESTINAL ENDOSCOPY  1/18/2022    WISDOM TOOTH EXTRACTION         Family History   Problem Relation Age of Onset    No Known Problems Mother     No Known Problems Father     Celiac disease Maternal Grandmother     No Known Problems Maternal Grandfather     No Known Problems Paternal Grandmother     No Known Problems Paternal Grandfather     Celiac disease Maternal Uncle     Crohn's disease Brother     Ulcerative colitis Brother          Medications have been verified.        Objective   /83 (BP Location: Right arm, Patient Position: Sitting)   Pulse 84   Temp 97.6 °F (36.4 °C) (Tympanic)   Resp 18   Ht 5' 10\" (1.778 m)   Wt 73.3 kg (161 lb 9.6 oz)   SpO2 99%   BMI 23.19 kg/m²   No LMP for male patient.       Physical Exam     Physical Exam  Constitutional:       Appearance: Normal appearance.   Skin:     Comments: Right hand: Right ring finger-reveals subungual hematoma extending to the nailbed.  There is some erythema at the base of the nail.  Tenderness over the distal portion of the finger but with good tactile sensation capillary refill.   Neurological:      Mental Status: He is alert.       Incision and drain    Date/Time: 4/29/2024 8:00 AM    Performed by: Bo Wynne MD  Authorized by: Bo Wynne MD  Universal Protocol:  Consent: Verbal consent obtained.  Consent given by: patient  Patient understanding: patient states understanding of the procedure being performed    Patient location:  Bedside  Location:     Type:  Subungual hematoma    Location:  Upper extremity    Upper extremity location:  R ring finger  Pre-procedure details:     Skin preparation:  Betadine  Anesthesia (see MAR for exact dosages):     Anesthesia method:  Local infiltration and nerve block    Local anesthetic:  Lidocaine 2% w/o epi    Block location:  Right ring finger    " Block needle gauge:  24 G    Block outcome:  Anesthesia achieved  Procedure details:     Complexity:  Simple    Incision types:  Other (comment)    Approach:  Puncture    Irrigation with saline:  10    Drainage:  Bloody  Post-procedure details:     Patient tolerance of procedure:  Tolerated well, no immediate complications

## 2024-05-29 ENCOUNTER — NURSE TRIAGE (OUTPATIENT)
Age: 24
End: 2024-05-29

## 2024-05-29 ENCOUNTER — TELEPHONE (OUTPATIENT)
Age: 24
End: 2024-05-29

## 2024-05-29 DIAGNOSIS — K52.9 COLITIS: Primary | ICD-10-CM

## 2024-05-29 RX ORDER — MESALAMINE 1000 MG/1
1000 SUPPOSITORY RECTAL
Qty: 30 SUPPOSITORY | Refills: 3 | Status: SHIPPED | OUTPATIENT
Start: 2024-05-29 | End: 2024-05-29

## 2024-05-29 RX ORDER — MESALAMINE 1000 MG/1
1000 SUPPOSITORY RECTAL
Qty: 30 SUPPOSITORY | Refills: 3 | Status: SHIPPED | OUTPATIENT
Start: 2024-05-29

## 2024-05-29 NOTE — TELEPHONE ENCOUNTER
Please advise   Last OV: 3/27/23   Hx: Lower abd pain, bloating, rectal bleeding  Colonoscopy/ EGD- 1/18/22     Pt calling in, reports rectal bleeding that has increased for about a month or so now. He explains had these symptoms about a year ago tried mesalamine and amitriptyline and was good until recently. Pt was not sure what helped but stopped both medications due to SE.   Reports Bms loose going 5-6x a day with dark/ bright red blood moderate amount, lower abdominal discomfort 3/10 constant with bloating. Pt is not on any medications currently.     Denies: recent antibx use, constipation, fevers, rashes or lightheadedness/ dizziness  He does have leg weakness     I schedule pt for OV for Nov and placed on wait list. Please advise of recs.

## 2024-05-29 NOTE — TELEPHONE ENCOUNTER
Hi,    I can send in canasa and can we add him on for a virtual visit tomorrow at 7am?    Thank you

## 2024-05-29 NOTE — TELEPHONE ENCOUNTER
Spoke with patient, relayed mesalamine suppository has been sent to [facility]. Pt scheduled 5/20 7 am virtual appointment with Dr. Mayfield.

## 2024-05-30 ENCOUNTER — APPOINTMENT (OUTPATIENT)
Dept: LAB | Facility: MEDICAL CENTER | Age: 24
End: 2024-05-30
Payer: COMMERCIAL

## 2024-05-30 ENCOUNTER — TELEMEDICINE (OUTPATIENT)
Dept: GASTROENTEROLOGY | Facility: CLINIC | Age: 24
End: 2024-05-30
Payer: COMMERCIAL

## 2024-05-30 DIAGNOSIS — K63.8219 SMALL INTESTINAL BACTERIAL OVERGROWTH (SIBO): ICD-10-CM

## 2024-05-30 DIAGNOSIS — K52.9 COLITIS: ICD-10-CM

## 2024-05-30 DIAGNOSIS — R10.33 PERIUMBILICAL ABDOMINAL PAIN: ICD-10-CM

## 2024-05-30 DIAGNOSIS — K62.89 RECTAL PAIN: ICD-10-CM

## 2024-05-30 DIAGNOSIS — K62.5 RECTAL BLEEDING: Primary | ICD-10-CM

## 2024-05-30 DIAGNOSIS — R19.7 DIARRHEA, UNSPECIFIED TYPE: ICD-10-CM

## 2024-05-30 DIAGNOSIS — K62.5 RECTAL BLEEDING: ICD-10-CM

## 2024-05-30 LAB — CRP SERPL QL: 8.1 MG/L

## 2024-05-30 PROCEDURE — 99214 OFFICE O/P EST MOD 30 MIN: CPT | Performed by: INTERNAL MEDICINE

## 2024-05-30 PROCEDURE — 86140 C-REACTIVE PROTEIN: CPT

## 2024-05-30 PROCEDURE — 36415 COLL VENOUS BLD VENIPUNCTURE: CPT

## 2024-05-30 NOTE — H&P (VIEW-ONLY)
Virtual Regular Visit    Verification of patient location:    Patient is located at Home in the following state in which I hold an active license [place]      Assessment/Plan:  The patient is a 23-year-old man with intermittent rectal bleeding who was last seen March 2023, also previously with symptoms of abdominal pain and loose stool, who now presents for follow-up. He was doing well until the past month when he developed diarrhea, rectal bleeding, abdominal pain and rectal pain.     Colonoscopy January 2022 with rectal erythema biopsies showed some focal active cryptitis in the rectum but otherwise normal.  Flexible sigmoidoscopy May 2022 with erythema in the rectum and biopsies normal.  Endoscopy from January 2022 with small hiatal hernia and gastric erythema and biopsy showing patchy intraepithelial lymphocytes and some inactive gastritis.  MR enterography from April normal.  Prior stool studies including C. difficile, Giardia, calprotectin, fecal elastase, fecal fat normal.  Prior celiac blood work negative.    SIBO test from March 2023 positive for hydrogen and Methergine overgrowth.  Most recent CRP from December 2022 within normal limits.  Blood work from May 2022 notable for CMP with sodium low at 135 but otherwise normal electrolytes, creatinine, liver test.  CBC at that time with normal white blood cell count, hemoglobin, MCV, platelets.    Problem List Items Addressed This Visit       Diarrhea     Other Visit Diagnoses       Rectal bleeding    -  Primary    Relevant Orders    Calprotectin,Fecal    C-reactive protein    Colitis        Relevant Orders    Calprotectin,Fecal    C-reactive protein    Small intestinal bacterial overgrowth (SIBO)        Rectal pain        Relevant Orders    C-reactive protein    Periumbilical abdominal pain        Relevant Orders    C-reactive protein          Continue Canasa suppositories  Consider Anusol suppositories and Anusol cream as needed  Check calprotectin and  CRP  Consider sigmoidoscopy  Consider gastric emptying study in the future  Consider trial of Elavil or nortriptyline in the future  Continue low FODMAP diet       Reason for visit is No chief complaint on file.       Encounter provider Batsheva Mayfield MD      Recent Visits  No visits were found meeting these conditions.  Showing recent visits within past 7 days and meeting all other requirements  Today's Visits  Date Type Provider Dept   05/30/24 Telemedicine Batsheva Mayfield MD [facility] [place]   Showing today's visits and meeting all other requirements  Future Appointments  No visits were found meeting these conditions.  Showing future appointments within next 150 days and meeting all other requirements       The patient was identified by name and date of birth. Radha Mccrary was informed that this is a telemedicine visit and that the visit is being conducted through the Epic Embedded platform. He agrees to proceed..  My office door was closed. No one else was in the room.  He acknowledged consent and understanding of privacy and security of the video platform. The patient has agreed to participate and understands they can discontinue the visit at any time.    Patient is aware this is a billable service.     It was my intent to perform this visit via video technology but the patient was not able to do a video connection so the visit was completed via audio telephone only.      Subjective  Radha Mccrary is a 23 y.o. 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     He has been mostly well. He was doing well. He was sticking loosely to the low FODMAP diet. This past month he has not been feeling as well. He has been having blood in his stool consistently. He tried the low FODMAP diet strictly for 1 month. He has urgency. He is going frequently. Sometimes just blood and sometimes a small amount of stool. It can be dark red but light red with mucous when he wipes. No black  stools. He can have 5-6 trips to the bathroom per day. He has been having rectal pain. He has pain around his umbilicus. Last evening he had pain behind the left side of the rib cage. Nothing makes it better or worse. No heartburn, dysphagia, odynophagia, nausea, vomiting, weight loss. Appetite has been good.       Past Medical History:   Diagnosis Date    Acne     History of bilateral inguinal hernias        Past Surgical History:   Procedure Laterality Date    COLONOSCOPY      EGD      INGUINAL HERNIA REPAIR  2000    UPPER GASTROINTESTINAL ENDOSCOPY  1/18/2022    WISDOM TOOTH EXTRACTION         Current Outpatient Medications   Medication Sig Dispense Refill    mesalamine (CANASA) 1,000 mg suppository Insert 1 suppository (1,000 mg total) into the rectum daily at bedtime 30 suppository 3     No current facility-administered medications for this visit.        No Known Allergies    REVIEW OF SYSTEMS:  10 point ROS reviewed and negative, except as above        PHYSICAL EXAMINATION:  Unable to perform physical examination given the unavailability of a video application.         Visit Time  Total Visit Duration: 20

## 2024-05-30 NOTE — PROGRESS NOTES
Virtual Regular Visit    Verification of patient location:    Patient is located at Home in the following state in which I hold an active license [place]      Assessment/Plan:  The patient is a 23-year-old man with intermittent rectal bleeding who was last seen March 2023, also previously with symptoms of abdominal pain and loose stool, who now presents for follow-up. He was doing well until the past month when he developed diarrhea, rectal bleeding, abdominal pain and rectal pain.     Colonoscopy January 2022 with rectal erythema biopsies showed some focal active cryptitis in the rectum but otherwise normal.  Flexible sigmoidoscopy May 2022 with erythema in the rectum and biopsies normal.  Endoscopy from January 2022 with small hiatal hernia and gastric erythema and biopsy showing patchy intraepithelial lymphocytes and some inactive gastritis.  MR enterography from April normal.  Prior stool studies including C. difficile, Giardia, calprotectin, fecal elastase, fecal fat normal.  Prior celiac blood work negative.    SIBO test from March 2023 positive for hydrogen and Methergine overgrowth.  Most recent CRP from December 2022 within normal limits.  Blood work from May 2022 notable for CMP with sodium low at 135 but otherwise normal electrolytes, creatinine, liver test.  CBC at that time with normal white blood cell count, hemoglobin, MCV, platelets.    Problem List Items Addressed This Visit       Diarrhea     Other Visit Diagnoses       Rectal bleeding    -  Primary    Relevant Orders    Calprotectin,Fecal    C-reactive protein    Colitis        Relevant Orders    Calprotectin,Fecal    C-reactive protein    Small intestinal bacterial overgrowth (SIBO)        Rectal pain        Relevant Orders    C-reactive protein    Periumbilical abdominal pain        Relevant Orders    C-reactive protein          Continue Canasa suppositories  Consider Anusol suppositories and Anusol cream as needed  Check calprotectin and  CRP  Consider sigmoidoscopy  Consider gastric emptying study in the future  Consider trial of Elavil or nortriptyline in the future  Continue low FODMAP diet       Reason for visit is No chief complaint on file.       Encounter provider Batsheva Mayfield MD      Recent Visits  No visits were found meeting these conditions.  Showing recent visits within past 7 days and meeting all other requirements  Today's Visits  Date Type Provider Dept   05/30/24 Telemedicine Batsheva Mayfield MD [facility] [place]   Showing today's visits and meeting all other requirements  Future Appointments  No visits were found meeting these conditions.  Showing future appointments within next 150 days and meeting all other requirements       The patient was identified by name and date of birth. Radha Mccrary was informed that this is a telemedicine visit and that the visit is being conducted through the Epic Embedded platform. He agrees to proceed..  My office door was closed. No one else was in the room.  He acknowledged consent and understanding of privacy and security of the video platform. The patient has agreed to participate and understands they can discontinue the visit at any time.    Patient is aware this is a billable service.     It was my intent to perform this visit via video technology but the patient was not able to do a video connection so the visit was completed via audio telephone only.      Subjective  Radha Mccrary is a 23 y.o. 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     He has been mostly well. He was doing well. He was sticking loosely to the low FODMAP diet. This past month he has not been feeling as well. He has been having blood in his stool consistently. He tried the low FODMAP diet strictly for 1 month. He has urgency. He is going frequently. Sometimes just blood and sometimes a small amount of stool. It can be dark red but light red with mucous when he wipes. No black  stools. He can have 5-6 trips to the bathroom per day. He has been having rectal pain. He has pain around his umbilicus. Last evening he had pain behind the left side of the rib cage. Nothing makes it better or worse. No heartburn, dysphagia, odynophagia, nausea, vomiting, weight loss. Appetite has been good.       Past Medical History:   Diagnosis Date    Acne     History of bilateral inguinal hernias        Past Surgical History:   Procedure Laterality Date    COLONOSCOPY      EGD      INGUINAL HERNIA REPAIR  2000    UPPER GASTROINTESTINAL ENDOSCOPY  1/18/2022    WISDOM TOOTH EXTRACTION         Current Outpatient Medications   Medication Sig Dispense Refill    mesalamine (CANASA) 1,000 mg suppository Insert 1 suppository (1,000 mg total) into the rectum daily at bedtime 30 suppository 3     No current facility-administered medications for this visit.        No Known Allergies    REVIEW OF SYSTEMS:  10 point ROS reviewed and negative, except as above        PHYSICAL EXAMINATION:  Unable to perform physical examination given the unavailability of a video application.         Visit Time  Total Visit Duration: 20

## 2024-05-31 ENCOUNTER — APPOINTMENT (OUTPATIENT)
Dept: LAB | Facility: MEDICAL CENTER | Age: 24
End: 2024-05-31
Payer: COMMERCIAL

## 2024-05-31 DIAGNOSIS — K52.9 COLITIS: ICD-10-CM

## 2024-05-31 DIAGNOSIS — K62.5 RECTAL BLEEDING: ICD-10-CM

## 2024-05-31 PROCEDURE — 83993 ASSAY FOR CALPROTECTIN FECAL: CPT

## 2024-06-06 LAB — CALPROTECTIN STL-MCNT: 606 UG/G (ref 0–120)

## 2024-06-07 ENCOUNTER — PATIENT MESSAGE (OUTPATIENT)
Dept: GASTROENTEROLOGY | Facility: CLINIC | Age: 24
End: 2024-06-07

## 2024-06-10 ENCOUNTER — TELEPHONE (OUTPATIENT)
Dept: GASTROENTEROLOGY | Facility: CLINIC | Age: 24
End: 2024-06-10

## 2024-06-10 NOTE — TELEPHONE ENCOUNTER
Left voicemail and requested call back   Scheduled tentatively June 18th at [place] - can he attend?    I sent a docTrackrt message as well - Miralax bowel prep

## 2024-06-11 ENCOUNTER — TELEPHONE (OUTPATIENT)
Age: 24
End: 2024-06-11

## 2024-06-11 DIAGNOSIS — K62.5 RECTAL BLEEDING: Primary | ICD-10-CM

## 2024-06-11 RX ORDER — SOD SULF/POT CHLORIDE/MAG SULF 1.479 G
TABLET ORAL
Qty: 24 TABLET | Refills: 0 | Status: SHIPPED | OUTPATIENT
Start: 2024-06-11 | End: 2024-06-18 | Stop reason: HOSPADM

## 2024-06-11 NOTE — TELEPHONE ENCOUNTER
Patient called the RX Refill Line. Message is being forwarded to the office.     Patient is requesting a prescription of SUTAB it is 24 Pills.    This is for patients upcoming Colonoscopy next week.    He stated he is ok with a generic for the medication if available    Please review and if further information is needed Please contact patient directly [phone]         Pharmacy- [id number] [place] [address] [place] [id number]

## 2024-06-12 ENCOUNTER — ANESTHESIA (OUTPATIENT)
Dept: ANESTHESIOLOGY | Facility: HOSPITAL | Age: 24
End: 2024-06-12

## 2024-06-12 ENCOUNTER — ANESTHESIA EVENT (OUTPATIENT)
Dept: ANESTHESIOLOGY | Facility: HOSPITAL | Age: 24
End: 2024-06-12

## 2024-06-17 RX ORDER — SODIUM CHLORIDE 9 MG/ML
125 INJECTION, SOLUTION INTRAVENOUS CONTINUOUS
Status: CANCELLED | OUTPATIENT
Start: 2024-06-17

## 2024-06-18 ENCOUNTER — ANESTHESIA (OUTPATIENT)
Dept: GASTROENTEROLOGY | Facility: MEDICAL CENTER | Age: 24
End: 2024-06-18

## 2024-06-18 ENCOUNTER — HOSPITAL ENCOUNTER (OUTPATIENT)
Dept: GASTROENTEROLOGY | Facility: MEDICAL CENTER | Age: 24
Setting detail: OUTPATIENT SURGERY
Discharge: HOME | End: 2024-06-18
Attending: INTERNAL MEDICINE
Payer: COMMERCIAL

## 2024-06-18 ENCOUNTER — ANESTHESIA EVENT (OUTPATIENT)
Dept: GASTROENTEROLOGY | Facility: MEDICAL CENTER | Age: 24
End: 2024-06-18

## 2024-06-18 VITALS
TEMPERATURE: 98 F | HEART RATE: 76 BPM | HEIGHT: 70 IN | SYSTOLIC BLOOD PRESSURE: 109 MMHG | WEIGHT: 161 LBS | OXYGEN SATURATION: 100 % | BODY MASS INDEX: 23.05 KG/M2 | RESPIRATION RATE: 16 BRPM | DIASTOLIC BLOOD PRESSURE: 59 MMHG

## 2024-06-18 DIAGNOSIS — K63.8219 SMALL INTESTINAL BACTERIAL OVERGROWTH (SIBO): ICD-10-CM

## 2024-06-18 DIAGNOSIS — K62.5 RECTAL BLEEDING: ICD-10-CM

## 2024-06-18 DIAGNOSIS — K52.9 COLITIS: Primary | ICD-10-CM

## 2024-06-18 DIAGNOSIS — K62.89 RECTAL PAIN: ICD-10-CM

## 2024-06-18 PROCEDURE — 45380 COLONOSCOPY AND BIOPSY: CPT | Performed by: INTERNAL MEDICINE

## 2024-06-18 PROCEDURE — 88305 TISSUE EXAM BY PATHOLOGIST: CPT | Performed by: PATHOLOGY

## 2024-06-18 PROCEDURE — 88342 IMHCHEM/IMCYTCHM 1ST ANTB: CPT | Performed by: PATHOLOGY

## 2024-06-18 RX ORDER — PROPOFOL 10 MG/ML
INJECTION, EMULSION INTRAVENOUS CONTINUOUS PRN
Status: DISCONTINUED | OUTPATIENT
Start: 2024-06-18 | End: 2024-06-18

## 2024-06-18 RX ORDER — MESALAMINE 4 G/60ML
4 SUSPENSION RECTAL
Qty: 30 ENEMA | Refills: 3 | Status: SHIPPED | OUTPATIENT
Start: 2024-06-18

## 2024-06-18 RX ORDER — MIDAZOLAM HYDROCHLORIDE 2 MG/2ML
INJECTION, SOLUTION INTRAMUSCULAR; INTRAVENOUS AS NEEDED
Status: DISCONTINUED | OUTPATIENT
Start: 2024-06-18 | End: 2024-06-18

## 2024-06-18 RX ORDER — PROPOFOL 10 MG/ML
INJECTION, EMULSION INTRAVENOUS AS NEEDED
Status: DISCONTINUED | OUTPATIENT
Start: 2024-06-18 | End: 2024-06-18

## 2024-06-18 RX ORDER — SODIUM CHLORIDE 9 MG/ML
125 INJECTION, SOLUTION INTRAVENOUS CONTINUOUS
Status: DISCONTINUED | OUTPATIENT
Start: 2024-06-18 | End: 2024-06-22 | Stop reason: HOSPADM

## 2024-06-18 RX ORDER — MESALAMINE 1.2 G/1
4800 TABLET, DELAYED RELEASE ORAL
Qty: 120 TABLET | Refills: 3 | Status: SHIPPED | OUTPATIENT
Start: 2024-06-18

## 2024-06-18 RX ORDER — LIDOCAINE HYDROCHLORIDE 20 MG/ML
INJECTION, SOLUTION EPIDURAL; INFILTRATION; INTRACAUDAL; PERINEURAL AS NEEDED
Status: DISCONTINUED | OUTPATIENT
Start: 2024-06-18 | End: 2024-06-18

## 2024-06-18 RX ADMIN — SODIUM CHLORIDE 125 ML/HR: 0.9 INJECTION, SOLUTION INTRAVENOUS at 14:24

## 2024-06-18 RX ADMIN — PROPOFOL 150 MG: 10 INJECTION, EMULSION INTRAVENOUS at 14:30

## 2024-06-18 RX ADMIN — PROPOFOL 150 MCG/KG/MIN: 10 INJECTION, EMULSION INTRAVENOUS at 14:30

## 2024-06-18 RX ADMIN — MIDAZOLAM 2 MG: 1 INJECTION INTRAMUSCULAR; INTRAVENOUS at 14:30

## 2024-06-18 RX ADMIN — LIDOCAINE HYDROCHLORIDE 100 MG: 20 INJECTION, SOLUTION EPIDURAL; INFILTRATION; INTRACAUDAL at 14:30

## 2024-06-18 NOTE — ANESTHESIA PREPROCEDURE EVALUATION
Procedure:  COLONOSCOPY    Relevant Problems   Surgery/Wound/Pain   (+) Lower abdominal pain      Other   (+) Diarrhea   (+) Occult blood positive stool        Physical Exam    Airway    Mallampati score: I  TM Distance: >3 FB  Neck ROM: full     Dental   No notable dental hx     Cardiovascular  Cardiovascular exam normal    Pulmonary  Pulmonary exam normal     Other Findings  post-pubertal.      Anesthesia Plan  ASA Score- 2     Anesthesia Type- IV sedation with anesthesia with ASA Monitors.         Additional Monitors:     Airway Plan:            Plan Factors-Exercise tolerance (METS): >4 METS.    Chart reviewed.    Patient summary reviewed.    Patient is not a current smoker.      Obstructive sleep apnea risk education given perioperatively.        Induction- intravenous.    Postoperative Plan-     Perioperative Resuscitation Plan - Level 1 - Full Code.       Informed Consent- Anesthetic plan and risks discussed with patient.

## 2024-06-18 NOTE — ANESTHESIA POSTPROCEDURE EVALUATION
"Post-Op Assessment Note    CV Status:  Stable    Pain management: adequate       Mental Status:  Alert and awake   Hydration Status:  Euvolemic   PONV Controlled:  Controlled   Airway Patency:  Patent     Post Op Vitals Reviewed: Yes    No anethesia notable event occurred.    Staff: Anesthesiologist               BP      Temp      Pulse     Resp      SpO2      /76   Pulse 84   Temp 98 °F (36.7 °C) (Temporal)   Resp 16   Ht 5' 10\" (1.778 m)   Wt 73 kg (161 lb)   SpO2 100%   BMI 23.10 kg/m²     "

## 2024-06-20 DIAGNOSIS — K52.9 COLITIS: ICD-10-CM

## 2024-06-20 PROCEDURE — 88305 TISSUE EXAM BY PATHOLOGIST: CPT | Performed by: PATHOLOGY

## 2024-06-20 PROCEDURE — 88342 IMHCHEM/IMCYTCHM 1ST ANTB: CPT | Performed by: PATHOLOGY

## 2024-06-20 RX ORDER — MESALAMINE 1000 MG/1
1000 SUPPOSITORY RECTAL
Qty: 90 SUPPOSITORY | Refills: 1 | Status: SHIPPED | OUTPATIENT
Start: 2024-06-20

## 2024-06-21 ENCOUNTER — TELEPHONE (OUTPATIENT)
Dept: GASTROENTEROLOGY | Facility: CLINIC | Age: 24
End: 2024-06-21

## 2024-07-11 DIAGNOSIS — K52.9 COLITIS: ICD-10-CM

## 2024-07-11 RX ORDER — MESALAMINE 4 G/60ML
4 SUSPENSION RECTAL
Qty: 5040 ML | Refills: 1 | Status: SHIPPED | OUTPATIENT
Start: 2024-07-11

## 2024-07-12 DIAGNOSIS — Z00.6 ENCOUNTER FOR EXAMINATION FOR NORMAL COMPARISON OR CONTROL IN CLINICAL RESEARCH PROGRAM: ICD-10-CM

## 2024-07-13 DIAGNOSIS — K52.9 COLITIS: ICD-10-CM

## 2024-07-15 RX ORDER — MESALAMINE 1.2 G/1
TABLET, DELAYED RELEASE ORAL
Qty: 360 TABLET | Refills: 2 | Status: SHIPPED | OUTPATIENT
Start: 2024-07-15

## 2024-07-24 ENCOUNTER — APPOINTMENT (OUTPATIENT)
Dept: LAB | Facility: MEDICAL CENTER | Age: 24
End: 2024-07-24

## 2024-07-24 DIAGNOSIS — Z00.6 ENCOUNTER FOR EXAMINATION FOR NORMAL COMPARISON OR CONTROL IN CLINICAL RESEARCH PROGRAM: ICD-10-CM

## 2024-07-24 PROCEDURE — 36415 COLL VENOUS BLD VENIPUNCTURE: CPT

## 2024-08-02 ENCOUNTER — TELEPHONE (OUTPATIENT)
Dept: FAMILY MEDICINE CLINIC | Facility: CLINIC | Age: 24
End: 2024-08-02

## 2024-08-02 DIAGNOSIS — K52.9 COLITIS: ICD-10-CM

## 2024-08-02 NOTE — TELEPHONE ENCOUNTER
----- Message from DIANA Kapadia sent at 8/2/2024 10:07 AM EDT -----  Reviewing Moriah's labs. Patient is overdue for physical. Please call patient to schedule an appointment and establish care with new provider.

## 2024-08-04 RX ORDER — MESALAMINE 4 G/60ML
4 SUSPENSION RECTAL
Qty: 5040 ML | Refills: 1 | Status: SHIPPED | OUTPATIENT
Start: 2024-08-04

## 2024-08-06 LAB
APOB+LDLR+PCSK9 GENE MUT ANL BLD/T: NOT DETECTED
BRCA1+BRCA2 DEL+DUP + FULL MUT ANL BLD/T: NOT DETECTED
MLH1+MSH2+MSH6+PMS2 GN DEL+DUP+FUL M: NOT DETECTED

## 2024-09-18 ENCOUNTER — OFFICE VISIT (OUTPATIENT)
Age: 24
End: 2024-09-18
Payer: COMMERCIAL

## 2024-09-18 VITALS
TEMPERATURE: 97.8 F | OXYGEN SATURATION: 98 % | HEART RATE: 77 BPM | HEIGHT: 70 IN | WEIGHT: 173.5 LBS | DIASTOLIC BLOOD PRESSURE: 60 MMHG | BODY MASS INDEX: 24.84 KG/M2 | SYSTOLIC BLOOD PRESSURE: 118 MMHG

## 2024-09-18 DIAGNOSIS — R19.7 DIARRHEA, UNSPECIFIED TYPE: ICD-10-CM

## 2024-09-18 DIAGNOSIS — K62.89 RECTAL PAIN: ICD-10-CM

## 2024-09-18 DIAGNOSIS — K51.20 ULCERATIVE PROCTITIS WITHOUT COMPLICATION (HCC): Primary | ICD-10-CM

## 2024-09-18 DIAGNOSIS — K58.0 IRRITABLE BOWEL SYNDROME WITH DIARRHEA: ICD-10-CM

## 2024-09-18 DIAGNOSIS — R11.0 NAUSEA: ICD-10-CM

## 2024-09-18 PROCEDURE — 99214 OFFICE O/P EST MOD 30 MIN: CPT | Performed by: INTERNAL MEDICINE

## 2024-09-18 RX ORDER — FAMOTIDINE 40 MG/1
40 TABLET, FILM COATED ORAL
Qty: 30 TABLET | Refills: 3 | Status: SHIPPED | OUTPATIENT
Start: 2024-09-18

## 2024-09-18 NOTE — PROGRESS NOTES
[facility] - Outpatient Follow-up Note  Radha Mccrary 24 y.o. male MRN: [id number]  Encounter: [id number]          ASSESSMENT AND PLAN:    Radha Mccrary is a 24 y.o. male with intermittent rectal bleeding and recent colonoscopy showing evidence of proctitis who now presents for follow-up. Overall doing better but some morning nausea and epigastric / meliza-umbilical discomfort    Colonoscopy from June 2024 with edematous granular mucosa in the rectum but otherwise normal-appearing colon.  Biopsies proctitis with mild to moderate activity and CMV negative normal colon and terminal ileum biopsies.    Calprotectin 606 back in May.  CRP elevated at 8.1 in May    Labs from 2 years ago notable for normal enteric panel, O&P, C. difficile, calprotectin.  CMP at that time with low sodium but otherwise normal electrolytes, creatinine, liver test.  CBC at that time normal.    1. 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 Rectal pain    3. Diarrhea, unspecified type    4. Irritable bowel syndrome with diarrhea    5. Nausea        Orders Placed This Encounter   Procedures    Calprotectin,Fecal    C-reactive protein     Flexible sigmoidoscopy June 2025 to evaluate for healing  Blood work and fecal calprotectin  Add pepcid at bedtime.   Recommend avoiding red meat as well as processed foods.    Ear small amounts    Routine vaccines  Routine skin exams with a dermatologist  ______________________________________________________________________    SUBJECTIVE:    Radha Mccrary is a 24 y.o. male who presents with complaint of proctitis.     The mesalamine helped but then he stopped because it made him feel unwell. He takes IBGard as needed.   1 month after the colonoscopy there was a little blood but since then no blood. Once in a while after a BM there was a little burning around the anus (inside and outside). With a normal stool it is not bad. HE usually has a BM every morning, maybe  2-3 times total.   He has pain around the umbilicus. Better when lying down or fluids. Not changed with food.   He is doing a FODMAP diet.   Some nausea in the morning. No vomiting.     Answers submitted by the patient for this visit:  Abdominal Pain Questionnaire (Submitted on 9/18/2024)  Chief Complaint: Abdominal pain  Chronicity: chronic  Onset: more than 1 year ago  Onset quality: gradual  Frequency: constantly  Episode duration: 4 Hours  Progression since onset: waxing and waning  Pain location: periumbilical region  Pain - numeric: 3/10  Pain quality: dull, a sensation of fullness  Radiates to: epigastric region  anorexia: Yes  arthralgias: Yes  constipation: Yes  diarrhea: Yes  flatus: Yes  hematochezia: Yes  myalgias: Yes  nausea: Yes  Aggravated by: certain positions, drinking alcohol, eating  Relieved by: certain positions, passing flatus, recumbency  Diagnostic workup: lower endoscopy      REVIEW OF SYSTEMS IS OTHERWISE NEGATIVE.  10 point ROS reviewed and negative, except as above      Historical Information   Past Medical History:   Diagnosis Date    Acne     History of bilateral inguinal hernias     IBD (inflammatory bowel disease)      Past Surgical History:   Procedure Laterality Date    COLONOSCOPY      EGD      INGUINAL HERNIA REPAIR  2000    UPPER GASTROINTESTINAL ENDOSCOPY  1/18/2022    WISDOM TOOTH EXTRACTION       Social History   Social History     Substance and Sexual Activity   Alcohol Use Not Currently    Alcohol/week: 3.0 standard drinks of alcohol    Types: 1 Glasses of wine, 1 Cans of beer, 1 Shots of liquor per week    Comment: not recently     Social History     Substance and Sexual Activity   Drug Use Not Currently    Types: Marijuana     Social History     Tobacco Use   Smoking Status Never   Smokeless Tobacco Never     Family History   Problem Relation Age of Onset    No Known Problems Mother     No Known Problems Father     Celiac disease Maternal Grandmother     No Known Problems  "Maternal Grandfather     No Known Problems Paternal Grandmother     No Known Problems Paternal Grandfather     Celiac disease Maternal Uncle     Crohn's disease Brother     Ulcerative colitis Brother        Meds/Allergies       Current Outpatient Medications:     famotidine (PEPCID) 40 MG tablet    mesalamine (CANASA) 1,000 mg suppository    mesalamine (LIALDA) 1.2 g EC tablet    mesalamine (ROWASA) 4 g    No Known Allergies        Objective     Blood pressure 118/60, pulse 77, temperature 97.8 °F (36.6 °C), temperature source Tympanic, height 5' 10\" (1.778 m), weight 78.7 kg (173 lb 8 oz), SpO2 98%. Body mass index is 24.89 kg/m².    PHYSICAL EXAMINATION:    General Appearance:   Alert, cooperative, no distress   HEENT:  Normocephalic, atraumatic, anicteric. Neck supple, symmetrical, trachea midline.   Lungs:   Equal chest rise and unlabored breathing, normal effort, no coughing.   Cardiovascular:   No visualized JVD.   Abdomen:   No abdominal distension.   Skin:   + acne on back   Musculoskeletal:   Normal range of motion visualized.   Psych:  Normal affect and normal insight.   Neuro:  Alert and appropriate.         Lab Results:   No visits with results within 1 Day(s) from this visit.   Latest known visit with results is:   Appointment on 07/24/2024   Component Date Value    LIAO SYNDROME DNA KT* 07/24/2024 Not Detected     HEREDITARY BREAST & OVAR* 07/24/2024 Not Detected     FAMILIAL HYPERCHOLESTERO* 07/24/2024 Not Detected        Lab Results   Component Value Date    WBC 9.55 05/05/2022    HGB 14.2 05/05/2022    HCT 43.4 05/05/2022    MCV 83 05/05/2022     05/05/2022       Lab Results   Component Value Date    SODIUM 135 (L) 05/05/2022    K 3.7 05/05/2022     05/05/2022    CO2 29 05/05/2022    AGAP 3 (L) 05/05/2022    BUN 12 05/05/2022    CREATININE 1.04 05/05/2022    GLUF 81 05/05/2022    CALCIUM 9.7 05/05/2022    AST 16 05/05/2022    ALT 25 05/05/2022    ALKPHOS 76 05/05/2022    TP 8.0 " "05/05/2022    TBILI 0.88 05/05/2022    EGFR 102 05/05/2022       Lab Results   Component Value Date    CRP 8.1 (H) 05/30/2024       No results found for: \"[id number]\", \"TSH\"    No results found for: \"IRON\", \"TIBC\", \"FERRITIN\"    Radiology Results:   No results found.  "

## 2024-10-08 ENCOUNTER — APPOINTMENT (OUTPATIENT)
Dept: LAB | Facility: MEDICAL CENTER | Age: 24
End: 2024-10-08

## 2024-10-14 ENCOUNTER — APPOINTMENT (OUTPATIENT)
Dept: LAB | Facility: MEDICAL CENTER | Age: 24
End: 2024-10-14
Attending: INTERNAL MEDICINE
Payer: COMMERCIAL

## 2024-10-14 DIAGNOSIS — K51.20 ULCERATIVE PROCTITIS WITHOUT COMPLICATION (HCC): ICD-10-CM

## 2024-10-14 PROCEDURE — 83993 ASSAY FOR CALPROTECTIN FECAL: CPT

## 2024-10-16 LAB — CALPROTECTIN STL-MCNC: 168 ΜG/G

## 2024-10-18 DIAGNOSIS — K51.20 ULCERATIVE PROCTITIS WITHOUT COMPLICATION (HCC): ICD-10-CM

## 2024-10-18 RX ORDER — FAMOTIDINE 40 MG/1
40 TABLET, FILM COATED ORAL
Qty: 90 TABLET | Refills: 1 | Status: SHIPPED | OUTPATIENT
Start: 2024-10-18

## 2024-11-13 ENCOUNTER — RESULTS FOLLOW-UP (OUTPATIENT)
Age: 24
End: 2024-11-13

## 2024-11-14 DIAGNOSIS — K52.9 COLITIS: ICD-10-CM

## 2024-11-14 RX ORDER — BUDESONIDE 3 MG/1
9 CAPSULE, COATED PELLETS ORAL DAILY
Qty: 90 CAPSULE | Refills: 3 | Status: SHIPPED | OUTPATIENT
Start: 2024-11-14

## 2024-11-14 RX ORDER — MESALAMINE 4 G/60ML
4 SUSPENSION RECTAL
Qty: 5040 ML | Refills: 1 | Status: SHIPPED | OUTPATIENT
Start: 2024-11-14

## 2024-11-20 ENCOUNTER — TELEPHONE (OUTPATIENT)
Dept: FAMILY MEDICINE CLINIC | Facility: CLINIC | Age: 24
End: 2024-11-20

## 2024-11-20 NOTE — TELEPHONE ENCOUNTER
Left message for patient. Patient is due for annual visit, patient has not been here since 12/07/22. Will also send letter to patient.

## 2024-11-26 DIAGNOSIS — K51.20 ULCERATIVE PROCTITIS WITHOUT COMPLICATION (HCC): Primary | ICD-10-CM

## 2024-11-26 RX ORDER — PREDNISONE 10 MG/1
TABLET ORAL
Qty: 70 TABLET | Refills: 0 | Status: SHIPPED | OUTPATIENT
Start: 2024-11-26 | End: 2024-12-24

## 2024-12-15 DIAGNOSIS — K52.9 COLITIS: ICD-10-CM

## 2024-12-17 DIAGNOSIS — K51.20 ULCERATIVE PROCTITIS WITHOUT COMPLICATION (HCC): ICD-10-CM

## 2024-12-17 RX ORDER — PREDNISONE 10 MG/1
TABLET ORAL
Qty: 70 TABLET | Refills: 0 | Status: SHIPPED | OUTPATIENT
Start: 2024-12-17 | End: 2025-01-13

## 2024-12-17 RX ORDER — BUDESONIDE 3 MG/1
9 CAPSULE, COATED PELLETS ORAL DAILY
Qty: 270 CAPSULE | Refills: 1 | Status: SHIPPED | OUTPATIENT
Start: 2024-12-17

## 2025-01-13 ENCOUNTER — OFFICE VISIT (OUTPATIENT)
Dept: FAMILY MEDICINE CLINIC | Facility: CLINIC | Age: 25
End: 2025-01-13
Payer: COMMERCIAL

## 2025-01-13 VITALS
HEIGHT: 69 IN | BODY MASS INDEX: 25.06 KG/M2 | HEART RATE: 82 BPM | SYSTOLIC BLOOD PRESSURE: 110 MMHG | WEIGHT: 169.2 LBS | RESPIRATION RATE: 18 BRPM | OXYGEN SATURATION: 99 % | DIASTOLIC BLOOD PRESSURE: 70 MMHG | TEMPERATURE: 98.1 F

## 2025-01-13 DIAGNOSIS — Z11.3 ROUTINE SCREENING FOR STI (SEXUALLY TRANSMITTED INFECTION): ICD-10-CM

## 2025-01-13 DIAGNOSIS — K51.20 ULCERATIVE PROCTITIS WITHOUT COMPLICATION (HCC): ICD-10-CM

## 2025-01-13 DIAGNOSIS — Z00.00 ANNUAL PHYSICAL EXAM: Primary | ICD-10-CM

## 2025-01-13 DIAGNOSIS — Z11.59 NEED FOR HEPATITIS C SCREENING TEST: ICD-10-CM

## 2025-01-13 PROCEDURE — 99395 PREV VISIT EST AGE 18-39: CPT

## 2025-01-13 NOTE — PROGRESS NOTES
Adult Annual Physical  Name: Radha Mccrary      : 2000      MRN: [id number]  Encounter Provider: Geoffrey Mcnally MD  Encounter Date: 2025   Encounter department: [facility]    Assessment & Plan  Annual physical exam         Need for hepatitis C screening test         Routine screening for STI (sexually transmitted infection)    Orders:  •  RPR-Syphilis Screening (Total Syphilis IGG/IGM); Future  •  Hepatitis panel, acute; Future  •  HIV 1/2 AG/AB w Reflex [facility] for 2 yr old and above; Future  •  Chlamydia/GC amplified DNA by PCR; Future  •  Trichomonas Vaginalis, WANG; Future    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 Appropriate education was printed on patient's after visit summary.    Counseling:  Alcohol/drug use: discussed moderation in alcohol intake, the recommendations for healthy alcohol use, and avoidance of illicit drug use.  Dental Health: discussed importance of regular tooth brushing, flossing, and dental visits.  Injury prevention: discussed safety/seat belts, safety helmets, smoke detectors, carbon monoxide detectors, and smoking near bedding or upholstery.  Sexual health: discussed sexually transmitted diseases, partner selection, use of condoms, avoidance of unintended pregnancy, and contraceptive alternatives.  Exercise: the importance of regular exercise/physical activity was discussed. Recommend exercise 3-5 times per week for at least 30 minutes.          History of Present Illness     Adult Annual Physical:  Patient presents for annual physical.     Diet and Physical Activity:  - Diet/Nutrition: well balanced diet, consuming 3-5 servings of fruits/vegetables daily and adequate fiber intake.  - Exercise: 1-2 times a week on average and 30-60 minutes on average.    Depression Screening:  - PHQ-2 Score: 0    General Health:  - Sleep: > 8  hours of sleep on average.  - Hearing: normal hearing bilateral ears.  - Vision: no vision problems.  - Dental: regular dental visits.     Health:  - History of STDs: no.   - Urinary symptoms: none.     Review of Systems   Constitutional:  Negative for chills and fever.   HENT:  Negative for ear pain and sore throat.    Eyes:  Negative for pain and visual disturbance.   Respiratory:  Negative for cough and shortness of breath.    Cardiovascular:  Negative for chest pain and palpitations.   Gastrointestinal:  Negative for abdominal pain and vomiting.   Genitourinary:  Negative for dysuria and hematuria.   Musculoskeletal:  Negative for arthralgias and back pain.   Skin:  Negative for color change and rash.   Neurological:  Negative for seizures and syncope.   All other systems reviewed and are negative.    Current Outpatient Medications on File Prior to Visit   Medication Sig Dispense Refill   • budesonide (ENTOCORT EC) 3 MG capsule TAKE 3 CAPSULES (9 MG TOTAL) BY MOUTH DAILY. 270 capsule 1   • predniSONE 10 mg tablet TAKE 4 TABLETS (40 MG TOTAL) BY MOUTH DAILY FOR 7 DAYS, THEN 3 TABLETS (30 MG TOTAL) DAILY FOR 7 DAYS, THEN 2 TABLETS (20 MG TOTAL) DAILY FOR 7 DAYS, THEN 1 TABLET (10 MG TOTAL) DAILY FOR 7 DAYS. 70 tablet 0   • [DISCONTINUED] famotidine (PEPCID) 40 MG tablet TAKE 1 TABLET BY MOUTH DAILY AT BEDTIME 90 tablet 1   • [DISCONTINUED] mesalamine (CANASA) 1,000 mg suppository INSERT 1 SUPPOSITORY (1,000 MG TOTAL) INTO THE RECTUM DAILY AT BEDTIME 90 suppository 1   • [DISCONTINUED] mesalamine (LIALDA) 1.2 g EC tablet TAKE 4 TABLETS BY MOUTH DAILY WITH BREAKFAST. 360 tablet 2   • [DISCONTINUED] mesalamine (ROWASA) 4 g Insert 60 mL (4 g total) into the rectum daily at bedtime 5040 mL 1     No current facility-administered medications on file prior to visit.      Social History     Tobacco Use   • Smoking status: Never   • Smokeless tobacco: Never   Vaping Use   • Vaping status: Never Used   Substance and Sexual  "Activity   • Alcohol use: Not Currently     Alcohol/week: 3.0 standard drinks of alcohol     Types: 1 Glasses of wine, 1 Cans of beer, 1 Shots of liquor per week     Comment: not recently   • Drug use: Yes     Types: Marijuana   • Sexual activity: Yes     Partners: Female     Birth control/protection: Condom Male       Objective   /70 (BP Location: Left arm, Patient Position: Sitting, Cuff Size: Standard)   Pulse 82   Temp 98.1 °F (36.7 °C) (Temporal)   Resp 18   Ht 5' 8.5\" (1.74 m)   Wt 76.7 kg (169 lb 3.2 oz)   SpO2 99%   BMI 25.35 kg/m²     Physical Exam  Vitals and nursing note reviewed.   Constitutional:       General: He is not in acute distress.     Appearance: He is well-developed.   HENT:      Head: Normocephalic and atraumatic.   Eyes:      Conjunctiva/sclera: Conjunctivae normal.   Cardiovascular:      Rate and Rhythm: Normal rate and regular rhythm.      Heart sounds: No murmur heard.  Pulmonary:      Effort: Pulmonary effort is normal. No respiratory distress.      Breath sounds: Normal breath sounds.   Abdominal:      Palpations: Abdomen is soft.      Tenderness: There is no abdominal tenderness.   Musculoskeletal:         General: No swelling.      Cervical back: Neck supple.   Skin:     General: Skin is warm and dry.      Capillary Refill: Capillary refill takes less than 2 seconds.   Neurological:      Mental Status: He is alert.   Psychiatric:         Mood and Affect: Mood normal.         "

## 2025-02-11 ENCOUNTER — TELEPHONE (OUTPATIENT)
Age: 25
End: 2025-02-11

## 2025-02-11 DIAGNOSIS — Z11.1 SCREENING-PULMONARY TB: Primary | ICD-10-CM

## 2025-02-11 NOTE — TELEPHONE ENCOUNTER
Pt called in inquiring about physical forms that needed to be filled out by PCP warm transfer to [place] clerical spoke with Imelda she will call pt when ready for  Thanks

## 2025-02-11 NOTE — TELEPHONE ENCOUNTER
Spoke to pt - agreeable to move appt to Feb 27th  Pt already aware Dr Mayfield leaving [facility]  Informed about [facility] and Dr Toledo being available to transition care

## 2025-02-12 ENCOUNTER — TELEPHONE (OUTPATIENT)
Age: 25
End: 2025-02-12

## 2025-02-26 PROBLEM — R11.0 NAUSEA: Status: ACTIVE | Noted: 2025-02-26

## 2025-02-26 PROBLEM — R10.33 PERIUMBILICAL ABDOMINAL PAIN: Status: ACTIVE | Noted: 2025-02-26

## 2025-02-26 NOTE — ASSESSMENT & PLAN NOTE
Radha Mccrary is a 24 y.o. male with intermittent rectal bleeding and recent colonoscopy showing evidence of proctitis who now presents for follow-up.     Overall stable with some mild symptoms.      Colonoscopy from June 2024 with edematous granular mucosa in the rectum but otherwise normal-appearing colon.  Biopsies proctitis with mild to moderate activity and CMV negative normal colon and terminal ileum biopsies.     Calprotectin 606 back in May.  CRP elevated at 8.1 in May  Calprotectin improved to 168 in October.     Labs from 2 years ago notable for normal enteric panel, O&P, C. difficile, calprotectin.  CMP at that time with low sodium but otherwise normal electrolytes, creatinine, liver test.  CBC at that time normal.           Flexible sigmoidoscopy June 2025 to evaluate for healing  Blood work and fecal calprotectin ordered today  Wean budesonide     Routine vaccines  Routine skin exams with a dermatologist    Orders:    Calprotectin,Fecal; Future    C-reactive protein; Future    CBC and differential; Future    Vitamin B12; Future    Vitamin D 25 hydroxy; Future    Iron Panel (Includes Ferritin, Iron Sat%, Iron, and TIBC); Future    Comprehensive metabolic panel; Future    Flexible Sigmoidoscopy; Future

## 2025-02-26 NOTE — PROGRESS NOTES
Name: Radha Mccrary      : 2000      MRN: [id number]  Encounter Provider: Batsheva Mayfield MD  Encounter Date: 2025   Encounter department: [facility]  :  Assessment & Plan  Ulcerative proctitis without complication (HCC)  Radha Mccrary is a 24 y.o. male with intermittent rectal bleeding and recent colonoscopy showing evidence of proctitis who now presents for follow-up.     Overall stable with some mild symptoms.      Colonoscopy from 2024 with edematous granular mucosa in the rectum but otherwise normal-appearing colon.  Biopsies proctitis with mild to moderate activity and CMV negative normal colon and terminal ileum biopsies.     Calprotectin 606 back in May.  CRP elevated at 8.1 in May  Calprotectin improved to 168 in October.     Labs from 2 years ago notable for normal enteric panel, O&P, C. difficile, calprotectin.  CMP at that time with low sodium but otherwise normal electrolytes, creatinine, liver test.  CBC at that time normal.           Flexible sigmoidoscopy 2025 to evaluate for healing  Blood work and fecal calprotectin ordered today  Wean budesonide     Routine vaccines  Routine skin exams with a dermatologist    Orders:    Calprotectin,Fecal; Future    C-reactive protein; Future    CBC and differential; Future    Vitamin B12; Future    Vitamin D 25 hydroxy; Future    Iron Panel (Includes Ferritin, Iron Sat%, Iron, and TIBC); Future    Comprehensive metabolic panel; Future    Flexible Sigmoidoscopy; Future    Periumbilical abdominal pain  As above  Consider IBGard as needed  Can also trial bentyl if needed       Nausea  Scopolamine patch if nausea returns  Zofran as needed         Iron deficiency    Orders:    Iron Panel (Includes Ferritin, Iron Sat%, Iron, and TIBC); Future    Vitamin B12 deficiency    Orders:    Vitamin B12; Future    Vitamin D deficiency    Orders:    Vitamin D 25 hydroxy; Future        History of Present  "Illness     Radha Mccrary is a 24 y.o. male who presents for follow-up.     He was in [place] in December and he felt better while eating there.   1 BMs per day, usually formed    Rare blood just at the end of a movement and in the stool and when he wipes.   + dark stools with some small spots of black. He has occasonal pains. Sometimes it really hurts. Mid abdomen above the umbilicus. It does not routinetly wake him up at night.   MST:  Have you recently lost weight?  No 0   Unsure 2    If yes, how much weight have you lost?  1-13lb  1  14-23lb 2  24-33lb 3  34lb or more  4    Weight loss score 0    Have you been eating poorly because of a decreased appetite?  No  0  Yes  1    Appetite score 0    MST 0 or 1 (not at risk)  MST 2 or more (at risk)          Review of Systems A complete review of systems is negative other than that noted above in the HPI.      Current Outpatient Medications   Medication Sig Dispense Refill    budesonide (ENTOCORT EC) 3 MG capsule TAKE 3 CAPSULES (9 MG TOTAL) BY MOUTH DAILY. 270 capsule 1     No current facility-administered medications for this visit.     Objective   /60 (BP Location: Left arm, Patient Position: Sitting, Cuff Size: Large)   Pulse 78   Temp 97.8 °F (36.6 °C) (Tympanic)   Ht 5' 8.5\" (1.74 m)   Wt 79.7 kg (175 lb 12.8 oz)   SpO2 98%   BMI 26.34 kg/m²     PHYSICAL EXAMINATION:    General Appearance:   Alert, cooperative, no distress   HEENT:  Normocephalic, atraumatic, anicteric. Neck supple, symmetrical, trachea midline.   Lungs:   Equal chest rise and unlabored breathing, normal effort, no coughing.   Cardiovascular:   No visualized JVD.   Abdomen:   No abdominal distension.   Skin:   No jaundice, rashes, or lesions.    Musculoskeletal:   Normal range of motion visualized.   Psych:  Normal affect and normal insight.   Neuro:  Alert and appropriate.         Lab Results: I personally reviewed relevant lab results.       Results for orders placed during the " hospital encounter of 06/18/24    Colonoscopy    Impression  The terminal ileum, cecum, ascending colon, transverse colon, descending colon and sigmoid colon appeared normal. Performed random biopsy using biopsy forceps.  Mild edematous, granular mucosa with erosion and loss of vascular pattern in the rectum, consistent with IBD; performed cold forceps biopsy        RECOMMENDATION:  Repeat colonoscopy in 1 year, due: 6/18/2025  Inflammatory bowel disease    Await pathology results  Start oral mesalamine 4.8 g/day.  Continue Canasa suppositories in the morning and also recommend Rowasa enemas in the evening.            Batsheva Mayfield MD

## 2025-02-27 ENCOUNTER — OFFICE VISIT (OUTPATIENT)
Age: 25
End: 2025-02-27
Payer: COMMERCIAL

## 2025-02-27 VITALS
TEMPERATURE: 97.8 F | WEIGHT: 175.8 LBS | OXYGEN SATURATION: 98 % | HEART RATE: 78 BPM | HEIGHT: 69 IN | DIASTOLIC BLOOD PRESSURE: 60 MMHG | SYSTOLIC BLOOD PRESSURE: 104 MMHG | BODY MASS INDEX: 26.04 KG/M2

## 2025-02-27 DIAGNOSIS — K51.20 ULCERATIVE PROCTITIS WITHOUT COMPLICATION (HCC): Primary | ICD-10-CM

## 2025-02-27 DIAGNOSIS — R11.0 NAUSEA: ICD-10-CM

## 2025-02-27 DIAGNOSIS — E61.1 IRON DEFICIENCY: ICD-10-CM

## 2025-02-27 DIAGNOSIS — E53.8 VITAMIN B12 DEFICIENCY: ICD-10-CM

## 2025-02-27 DIAGNOSIS — E55.9 VITAMIN D DEFICIENCY: ICD-10-CM

## 2025-02-27 DIAGNOSIS — R10.33 PERIUMBILICAL ABDOMINAL PAIN: ICD-10-CM

## 2025-02-27 PROCEDURE — 99214 OFFICE O/P EST MOD 30 MIN: CPT | Performed by: INTERNAL MEDICINE

## 2025-02-27 RX ORDER — SODIUM CHLORIDE, SODIUM LACTATE, POTASSIUM CHLORIDE, CALCIUM CHLORIDE 600; 310; 30; 20 MG/100ML; MG/100ML; MG/100ML; MG/100ML
125 INJECTION, SOLUTION INTRAVENOUS CONTINUOUS
OUTPATIENT
Start: 2025-02-27

## 2025-03-05 ENCOUNTER — TELEPHONE (OUTPATIENT)
Age: 25
End: 2025-03-05

## 2025-03-06 ENCOUNTER — ANESTHESIA (OUTPATIENT)
Dept: ANESTHESIOLOGY | Facility: HOSPITAL | Age: 25
End: 2025-03-06

## 2025-03-06 ENCOUNTER — ANESTHESIA EVENT (OUTPATIENT)
Dept: ANESTHESIOLOGY | Facility: HOSPITAL | Age: 25
End: 2025-03-06

## 2025-03-13 ENCOUNTER — TELEPHONE (OUTPATIENT)
Age: 25
End: 2025-03-13

## 2025-03-13 NOTE — TELEPHONE ENCOUNTER
Patients GI provider:  Dr. Mayfield    Number to return call: [phone]    Reason for call: Pt called in regard to flexsig. Pt was scheduled for 3/20/2025 [place]. Pt is requesting to reschedule.    Scheduled procedure/appointment date if applicable: 9/3/2025

## 2025-03-17 NOTE — TELEPHONE ENCOUNTER
Spoke to pt - agreeable to schedule with Dr Toledo April 18th at [place]   He confirmed he was given the miralax prep instructions

## 2025-03-26 ENCOUNTER — RESULTS FOLLOW-UP (OUTPATIENT)
Age: 25
End: 2025-03-26

## 2025-03-26 LAB
IRON SATN MFR SERPL: 10 % (ref 20–50)
IRON SERPL-MCNC: 43 UG/DL (ref 50–212)
TIBC SERPL-MCNC: 421 UG/DL (ref 260–430)
TRANSFERRIN SERPL-MCNC: 301 MG/DL (ref 203–362)

## 2025-03-28 DIAGNOSIS — D50.0 IRON DEFICIENCY ANEMIA DUE TO CHRONIC BLOOD LOSS: Primary | ICD-10-CM

## 2025-03-28 PROBLEM — D50.9 IRON DEFICIENCY ANEMIA: Status: ACTIVE | Noted: 2025-03-28

## 2025-03-28 RX ORDER — SODIUM CHLORIDE 9 MG/ML
20 INJECTION, SOLUTION INTRAVENOUS ONCE
OUTPATIENT
Start: 2025-04-03

## 2025-04-03 ENCOUNTER — TELEPHONE (OUTPATIENT)
Age: 25
End: 2025-04-03

## 2025-04-03 ENCOUNTER — TELEPHONE (OUTPATIENT)
Dept: INFUSION CENTER | Facility: CLINIC | Age: 25
End: 2025-04-03

## 2025-04-03 NOTE — TELEPHONE ENCOUNTER
4/3 spoke to pts mom, relayed auth not required and gave her the CPT code  so she can review as well. Other questions answered.

## 2025-04-03 NOTE — TELEPHONE ENCOUNTER
4/3 spoke to pt he did give consent to discuss with his mom, explained to him that auth was not required for the Iron infusion. He is good to go without one and can have iron infusions

## 2025-04-03 NOTE — TELEPHONE ENCOUNTER
Called patient to review new patient information prior to infusion appointment on 4/4/25 at 10:00 for Venofer. Discussed the location of the infusion center, projected length of appointment, visitor policy, and availability of snacks, drinks, and WiFi. All questions answered.

## 2025-04-03 NOTE — TELEPHONE ENCOUNTER
Pt's Mother calling- comm form is , but pt spoke with staff yesterday and gave permission to speak with his Mother.  Mother calling because insurance is asking for CPT codes for his infusion.  Reviewed chart and note from IBD team which states pt can go for infusions without an auth being required.      Pt's Mother has further questions about the auth, CPT codes being needed.  Advised will send to IBD team.      CB for Mom Mechelle Mccrary is [phone]

## 2025-04-03 NOTE — TELEPHONE ENCOUNTER
"Pts mother calling in, she is not on consent and is aware.   She reports pt has iron infusion tomorrow and when mother called insurance company they states they do not show any pre auth and she should call GI office for a \"code\" and pt may not want to go to the infusion tomorrow until this is resolved.     Please call back to discuss she is aware you would need to call pt for verbal consent.   "

## 2025-04-04 ENCOUNTER — HOSPITAL ENCOUNTER (OUTPATIENT)
Dept: INFUSION CENTER | Facility: CLINIC | Age: 25
End: 2025-04-04
Payer: COMMERCIAL

## 2025-04-04 ENCOUNTER — ANESTHESIA EVENT (OUTPATIENT)
Dept: ANESTHESIOLOGY | Facility: HOSPITAL | Age: 25
End: 2025-04-04

## 2025-04-04 ENCOUNTER — TELEPHONE (OUTPATIENT)
Age: 25
End: 2025-04-04

## 2025-04-04 ENCOUNTER — ANESTHESIA (OUTPATIENT)
Dept: ANESTHESIOLOGY | Facility: HOSPITAL | Age: 25
End: 2025-04-04

## 2025-04-04 VITALS
TEMPERATURE: 98.2 F | DIASTOLIC BLOOD PRESSURE: 63 MMHG | HEART RATE: 78 BPM | SYSTOLIC BLOOD PRESSURE: 117 MMHG | RESPIRATION RATE: 16 BRPM

## 2025-04-04 DIAGNOSIS — D50.0 IRON DEFICIENCY ANEMIA DUE TO CHRONIC BLOOD LOSS: Primary | ICD-10-CM

## 2025-04-04 PROCEDURE — 96365 THER/PROPH/DIAG IV INF INIT: CPT

## 2025-04-04 RX ORDER — SODIUM CHLORIDE 9 MG/ML
20 INJECTION, SOLUTION INTRAVENOUS ONCE
Status: CANCELLED | OUTPATIENT
Start: 2025-04-11

## 2025-04-04 RX ORDER — SODIUM CHLORIDE 9 MG/ML
20 INJECTION, SOLUTION INTRAVENOUS ONCE
Status: COMPLETED | OUTPATIENT
Start: 2025-04-04 | End: 2025-04-04

## 2025-04-04 RX ADMIN — SODIUM CHLORIDE 20 ML/HR: 0.9 INJECTION, SOLUTION INTRAVENOUS at 10:18

## 2025-04-04 RX ADMIN — IRON SUCROSE 200 MG: 20 INJECTION, SOLUTION INTRAVENOUS at 10:18

## 2025-04-04 NOTE — PROGRESS NOTES
Patient presents for Venofer infusion and is without complaints at this time. Patient tolerated infusion without incident. AVS printed and reviewed with patient. Aware of next appointment on 4/11 @ 230pm.

## 2025-04-04 NOTE — TELEPHONE ENCOUNTER
Pt. Of Dr. Mayfield's who is getting iron infusions, Shen from [facility]'s infusion calling stating pt. Needs orders signed by active provider since Dr. Mayfield's left , please re-sign order for iron infusion

## 2025-04-09 ENCOUNTER — TELEPHONE (OUTPATIENT)
Age: 25
End: 2025-04-09

## 2025-04-09 NOTE — TELEPHONE ENCOUNTER
Confirming Upcoming Procedure: flex sig on 04/18/2025  Physician performing: Dr. Toledo  Location of procedure:  [facility]   Prep: enema and clear liquids     Spoke with patient told him his prep change per dr bueno. I told him I will send a RABBL message.

## 2025-04-11 ENCOUNTER — HOSPITAL ENCOUNTER (OUTPATIENT)
Dept: INFUSION CENTER | Facility: CLINIC | Age: 25
End: 2025-04-11
Attending: INTERNAL MEDICINE
Payer: COMMERCIAL

## 2025-04-11 VITALS
HEART RATE: 88 BPM | DIASTOLIC BLOOD PRESSURE: 69 MMHG | SYSTOLIC BLOOD PRESSURE: 114 MMHG | RESPIRATION RATE: 18 BRPM | TEMPERATURE: 98 F

## 2025-04-11 DIAGNOSIS — D50.0 IRON DEFICIENCY ANEMIA DUE TO CHRONIC BLOOD LOSS: Primary | ICD-10-CM

## 2025-04-11 PROCEDURE — 96365 THER/PROPH/DIAG IV INF INIT: CPT

## 2025-04-11 RX ORDER — SODIUM CHLORIDE 9 MG/ML
20 INJECTION, SOLUTION INTRAVENOUS ONCE
Status: CANCELLED | OUTPATIENT
Start: 2025-04-18

## 2025-04-11 RX ORDER — SODIUM CHLORIDE 9 MG/ML
20 INJECTION, SOLUTION INTRAVENOUS ONCE
Status: COMPLETED | OUTPATIENT
Start: 2025-04-11 | End: 2025-04-11

## 2025-04-11 RX ADMIN — IRON SUCROSE 200 MG: 20 INJECTION, SOLUTION INTRAVENOUS at 14:52

## 2025-04-11 RX ADMIN — SODIUM CHLORIDE 20 ML/HR: 9 INJECTION, SOLUTION INTRAVENOUS at 14:45

## 2025-04-11 NOTE — PROGRESS NOTES
Patient arrived to unit without complaint. Patient tolerated Venofer infusion without incident. AVS declined and patient aware of next appointment on 4/18/25 at 14:30. Patient left in stable condition.

## 2025-04-17 ENCOUNTER — HOSPITAL ENCOUNTER (OUTPATIENT)
Dept: INFUSION CENTER | Facility: CLINIC | Age: 25
Discharge: HOME | End: 2025-04-17
Attending: INTERNAL MEDICINE
Payer: COMMERCIAL

## 2025-04-17 VITALS
TEMPERATURE: 97.8 F | HEART RATE: 81 BPM | RESPIRATION RATE: 16 BRPM | SYSTOLIC BLOOD PRESSURE: 112 MMHG | DIASTOLIC BLOOD PRESSURE: 71 MMHG

## 2025-04-17 DIAGNOSIS — D50.0 IRON DEFICIENCY ANEMIA DUE TO CHRONIC BLOOD LOSS: Primary | ICD-10-CM

## 2025-04-17 PROCEDURE — 96365 THER/PROPH/DIAG IV INF INIT: CPT

## 2025-04-17 RX ORDER — SODIUM CHLORIDE 9 MG/ML
20 INJECTION, SOLUTION INTRAVENOUS ONCE
Status: COMPLETED | OUTPATIENT
Start: 2025-04-17 | End: 2025-04-17

## 2025-04-17 RX ORDER — SODIUM CHLORIDE 9 MG/ML
20 INJECTION, SOLUTION INTRAVENOUS ONCE
Status: CANCELLED | OUTPATIENT
Start: 2025-04-25

## 2025-04-17 RX ADMIN — SODIUM CHLORIDE 20 ML/HR: 0.9 INJECTION, SOLUTION INTRAVENOUS at 08:23

## 2025-04-17 RX ADMIN — IRON SUCROSE 200 MG: 20 INJECTION, SOLUTION INTRAVENOUS at 08:33

## 2025-04-18 ENCOUNTER — HOSPITAL ENCOUNTER (OUTPATIENT)
Dept: GASTROENTEROLOGY | Facility: MEDICAL CENTER | Age: 25
Setting detail: OUTPATIENT SURGERY
Discharge: HOME | End: 2025-04-18
Attending: INTERNAL MEDICINE
Payer: COMMERCIAL

## 2025-04-18 VITALS
HEART RATE: 61 BPM | BODY MASS INDEX: 25.92 KG/M2 | RESPIRATION RATE: 20 BRPM | SYSTOLIC BLOOD PRESSURE: 120 MMHG | OXYGEN SATURATION: 97 % | WEIGHT: 175 LBS | HEIGHT: 69 IN | DIASTOLIC BLOOD PRESSURE: 80 MMHG | TEMPERATURE: 98.4 F

## 2025-04-18 DIAGNOSIS — K51.20 ULCERATIVE PROCTITIS WITHOUT COMPLICATION (HCC): ICD-10-CM

## 2025-04-18 PROCEDURE — 88305 TISSUE EXAM BY PATHOLOGIST: CPT | Performed by: PATHOLOGY

## 2025-04-18 PROCEDURE — 45331 SIGMOIDOSCOPY AND BIOPSY: CPT | Performed by: INTERNAL MEDICINE

## 2025-04-18 RX ORDER — SODIUM CHLORIDE, SODIUM LACTATE, POTASSIUM CHLORIDE, CALCIUM CHLORIDE 600; 310; 30; 20 MG/100ML; MG/100ML; MG/100ML; MG/100ML
125 INJECTION, SOLUTION INTRAVENOUS CONTINUOUS
Status: DISCONTINUED | OUTPATIENT
Start: 2025-04-18 | End: 2025-04-22 | Stop reason: HOSPADM

## 2025-04-18 RX ORDER — SODIUM CHLORIDE, SODIUM LACTATE, POTASSIUM CHLORIDE, CALCIUM CHLORIDE 600; 310; 30; 20 MG/100ML; MG/100ML; MG/100ML; MG/100ML
125 INJECTION, SOLUTION INTRAVENOUS CONTINUOUS
Status: CANCELLED | OUTPATIENT
Start: 2025-04-18

## 2025-04-18 NOTE — H&P
"History and Physical -  [facility]  Radha Mccrary 24 y.o. male MRN: [id number]                  HPI: Radha Mccrary is a 24 y.o. year old male who presents for sigmoidoscopy for history of ulcerative proctitis.      REVIEW OF SYSTEMS: Per the HPI, and otherwise unremarkable.    Historical Information   Past Medical History:   Diagnosis Date    Acne     GI (gastrointestinal bleed) 1/1/22    History of bilateral inguinal hernias     IBD (inflammatory bowel disease)      Past Surgical History:   Procedure Laterality Date    COLONOSCOPY      EGD      INGUINAL HERNIA REPAIR  2000    UPPER GASTROINTESTINAL ENDOSCOPY  1/18/2022    WISDOM TOOTH EXTRACTION       Social History   Social History     Substance and Sexual Activity   Alcohol Use Yes    Alcohol/week: 3.0 standard drinks of alcohol    Types: 1 Glasses of wine, 1 Cans of beer, 1 Shots of liquor per week    Comment: not recently     Social History     Substance and Sexual Activity   Drug Use Not Currently    Types: Marijuana     Social History     Tobacco Use   Smoking Status Never   Smokeless Tobacco Never     Family History   Problem Relation Age of Onset    No Known Problems Mother     No Known Problems Father     Celiac disease Maternal Grandmother     No Known Problems Maternal Grandfather     No Known Problems Paternal Grandmother     No Known Problems Paternal Grandfather     Celiac disease Maternal Uncle     Crohn's disease Brother     Ulcerative colitis Brother        Meds/Allergies     No current outpatient medications on file.    Current Facility-Administered Medications:     lactated ringers infusion, 125 mL/hr, Intravenous, Continuous    No Known Allergies    Objective     /66   Pulse 84   Temp 98.4 °F (36.9 °C) (Temporal)   Resp 16   Ht 5' 8.5\" (1.74 m)   Wt 79.4 kg (175 lb)   SpO2 100%   BMI 26.22 kg/m²       PHYSICAL EXAM    Gen: NAD  Head: NCAT  CV: RRR  CHEST: Clear  ABD: soft, NT/ND  EXT: no " edema      ASSESSMENT/PLAN:  This is a 24 y.o. year old male here for flexible sigmoidoscopy, and he is stable and optimized for his procedure.

## 2025-04-22 PROCEDURE — 88305 TISSUE EXAM BY PATHOLOGIST: CPT | Performed by: PATHOLOGY

## 2025-04-25 ENCOUNTER — HOSPITAL ENCOUNTER (OUTPATIENT)
Dept: INFUSION CENTER | Facility: CLINIC | Age: 25
End: 2025-04-25
Payer: COMMERCIAL

## 2025-04-25 DIAGNOSIS — D50.0 IRON DEFICIENCY ANEMIA DUE TO CHRONIC BLOOD LOSS: Primary | ICD-10-CM

## 2025-04-25 PROCEDURE — 96365 THER/PROPH/DIAG IV INF INIT: CPT

## 2025-04-25 RX ORDER — SODIUM CHLORIDE 9 MG/ML
20 INJECTION, SOLUTION INTRAVENOUS ONCE
Status: CANCELLED | OUTPATIENT
Start: 2025-05-02

## 2025-04-25 RX ORDER — SODIUM CHLORIDE 9 MG/ML
20 INJECTION, SOLUTION INTRAVENOUS ONCE
Status: COMPLETED | OUTPATIENT
Start: 2025-04-25 | End: 2025-04-25

## 2025-04-25 RX ADMIN — IRON SUCROSE 200 MG: 20 INJECTION, SOLUTION INTRAVENOUS at 09:49

## 2025-04-25 RX ADMIN — SODIUM CHLORIDE 20 ML/HR: 9 INJECTION, SOLUTION INTRAVENOUS at 09:45

## 2025-04-25 NOTE — PROGRESS NOTES
Pt presents for venofer. No new meds or concerns. Pt tolerated treatment without adverse reaction. Future appointments discussed, confirmed with patient for 5/2/25 1030. AVS declined.

## 2025-04-30 ENCOUNTER — OFFICE VISIT (OUTPATIENT)
Age: 25
End: 2025-04-30
Payer: COMMERCIAL

## 2025-04-30 ENCOUNTER — RESULTS FOLLOW-UP (OUTPATIENT)
Age: 25
End: 2025-04-30

## 2025-04-30 VITALS
OXYGEN SATURATION: 97 % | HEART RATE: 81 BPM | WEIGHT: 178.4 LBS | TEMPERATURE: 97.2 F | BODY MASS INDEX: 25.54 KG/M2 | SYSTOLIC BLOOD PRESSURE: 110 MMHG | DIASTOLIC BLOOD PRESSURE: 52 MMHG | HEIGHT: 70 IN

## 2025-04-30 DIAGNOSIS — K51.20 ULCERATIVE PROCTITIS WITHOUT COMPLICATION (HCC): Primary | ICD-10-CM

## 2025-04-30 PROBLEM — K52.9 IBD (INFLAMMATORY BOWEL DISEASE): Status: ACTIVE | Noted: 2025-04-30

## 2025-04-30 PROCEDURE — 99213 OFFICE O/P EST LOW 20 MIN: CPT | Performed by: INTERNAL MEDICINE

## 2025-04-30 RX ORDER — MESALAMINE 1.2 G/1
4800 TABLET, DELAYED RELEASE ORAL
Qty: 360 TABLET | Refills: 1 | Status: SHIPPED | OUTPATIENT
Start: 2025-04-30

## 2025-04-30 RX ORDER — MESALAMINE 1000 MG/1
1000 SUPPOSITORY RECTAL
Qty: 30 SUPPOSITORY | Refills: 1 | Status: SHIPPED | OUTPATIENT
Start: 2025-04-30

## 2025-04-30 NOTE — Clinical Note
Hello, he had labs done recently at [facility]. Can we please get those results? Thanks

## 2025-04-30 NOTE — PROGRESS NOTES
Gastroenterology Outpatient Follow-up - Ulcerative Colitis  Radha Mccrary 24 y.o. male MRN: [id number]  Encounter: [id number]    Radha Mccrary is a 24 y.o. male with Ulcerative colitis.    Symptom onset:  2021  Diagnosis:  ulcerative colitis  Year of diagnosis:  2022    IBD Summary:      Ulcerative Colitis Summary  Macroscopic extent of diseases: ulcerative proctitis  Microscopic extent of diseases:  ulcerative proctitis  Has the patient ever been hospitalized for severe disease: No        Surgical History  Number of IBD surgeries: 0      First IBD surgery:    Most Recent IBD surgery:    Esophageal:  0    Gastroduodenal:  0    Small bowel resection(s):  0    Ileocolonic resection(s): 0      Colonic resection(s):  0    Ileostomy or colostomy:  no previous ostomy    Complete colectomy:  No         Medications     Year last used Reason for discontinuation   Corticosteroids   never         Thiopurines   never         Methotrexate   never         Infliximab   never         Adalimumab   never         Certolizumab   never         Golimumab   never         Natalizumab   never         Mesalamine prior     CR     Sulfasalzine   never         Vedolizumab   never         Ustekinumab   never         Tofacitinib   never         Other biologic   never             Extraintestinal Manifestations    IBD-associated arthropathy No    Uveitis No    Oral aphthous ulcers No   Erythema nodosum No    Pyoderma gangrenosum No    Primary sclerosing cholangitis No    Thrombotic complications No          Cancer / Dysplasia History  History of IBD-associated dysplasia: none    Date of diagnosis (Year):     History of colorectal cancer: No   History of cervical dysplasia: No   History of skin cancer: none         Laboratory Data   Most recent (date) Result   PPD 2/7/2020 negative   Quanitferon gold       TPMT   unknown   Hepatitis A   unknown   HBsAb   unknown   HBcAb   unknown   HBsAg   unknown   HCV Ab   unknown       Imaging / Diagnostic  Procedures   Most recent (date) Findings   Colonoscopy or sigmoidoscopy #1 6/18/2024            Ulcers/Erosions  small           Strictures  none           Stricture Severity  N/A           Endoscopic Score Dubois Score:  1 Rutgeert's:  N/A            Findings  (1) The terminal ileum, cecum, ascending colon, transverse colon, descending colon and sigmoid colon appeared normal. Performed random biopsy using biopsy forceps.  (2) Mild edematous and granular mucosa with erosion and loss of vascular pattern that did not appear nodular in the rec.lisa, consistent with IBD; performed cold forceps biopsy to rule out colitis and IBD           Pathology  A. Colon, cold biopsy terminal ileum r/o IBD:  - Benign, unremarkable small bowel mucosa.  - No chronic or active ileitis.    -- No granulomas or organism.     -- No villous atrophy and no intraepithelial lymphocytosis.  - No glandular dysplasia and no evidence of malignancy.     B. Colon, cold biopsy cecum r/o IBD:  - Benign colonic mucosa with non-specific reactive changes.  - No active or chronic colitis.    -- No granulomas or organism.   - No glandular dysplasia and no evidence of malignancy.     C. Colon, cold biopsy ascending colon r/o IBD:  - Benign colonic mucosa with non-specific reactive changes.  - No active or chronic colitis.    -- No granulomas or organism.   - No glandular dysplasia and no evidence of malignancy.   D. Colon, cold biopsy transverse colon r/o IBD:  - Benign colonic mucosa with non-specific reactive changes.  - No active or chronic colitis.    -- No granulomas or organism.   - No glandular dysplasia and no evidence of malignancy.    E. Colon, cold biopsy descending colon r/o IBD:  - Benign colonic mucosa with non-specific reactive changes.  - No active or chronic colitis.    -- No granulomas or organism.   - Detached portion of benign unremarkable small bowel mucosa, likely contaminant.   - No glandular dysplasia and no evidence of malignancy.    F.  Colon, cold biopsy sigmoid colon r/o IBD:  - Benign colonic mucosa with non-specific reactive changes.  - No active or chronic colitis.    -- No granulomas or organism.   - No glandular dysplasia and no evidence of malignancy.    G. Colon, cold biopsy rectum r/o IBD:  - Chronic active proctitis with mild to moderate activity.  - Cytomegalovirus (CMV) immunostain pending; result will be reported in an addendum.   - Negative for dysplasia and malignancy.     Colonoscopy or sigmoidoscopy #2 4/18/2025            Ulcers/Erosions  no erosions              Strictures  none              Stricture Severity  N/A           Endoscopic Score Dubois Score:  1 Rugeert's:  N/A           Findings  (1) The sigmoid colon appeared normal.  (2) Mild, localized erythematous mucosa with erosion in the rectum, consistent with ulcerative colitis; performed cold forceps biopsy Hx of ulcerative colitis. The findings were very mild and subtle, localized to the distal rectum.  PATH:  A.  30 cm, colon (biopsy):     - Colonic mucosa with no significant pathologic abnormalities.     - No granulomas, dysplasia or neoplasia identified.  B.  20 cm, colon (biopsy):     - Colonic mucosa with no significant pathologic abnormalities.     - No granulomas, dysplasia or neoplasia identified.  C.  Rectum (biopsy):     - Colonic mucosa with focal mild active proctitis and prominent lymphoid aggregates.     - No definitive chronic inflammatory change.     - No granulomas, dysplasia or neoplasia identified.           Pathology      EGD 1/18/2022 Mild antral erythema.   Small bowel follow-through       CT enterography       CT without enterography       MRI enterography 4/26/2022 Normal.   Capsule study       DEXA scan   Lowest Z-score:      CXR (for TB)           HPI:   Interval History:  4/30/2025 Follow up  Radha Mccrary is following up for mild ulcerative proctitis.  He was Dr. Mayfield's patient.  He developed symptoms intermittent diarrhea and bleeding  in 2021, and was diagnosed in 2022.  Colonoscopy showed Dubois 1 ulcerative proctitis.  He treated with oral and topical mesalamine but is currently off therapy.  I just did a flexible sigmoidoscopy in April which showed mild erythema in the distal rectum and biopsies showed mild focal proctitis.    Currently, bowel function is normal although he is not on therapy.  He feels tired and bloated and has periumbilical pain.  He has iron deficiency and is receiving IV infusions.      He has an uncle with celiac disease and brother with Crohn's.  He had blood work done in [facility], which I do not have.      Radha reports the following symptoms over the last 7 days:    Stool Frequency normal   Average stools per day: 2   Average liquid stools per day: 0   Consistency of bowel: soft or semi-formed   Awakening from sleep to move bowels? No   Urgency mild fecal urgency   Visible blood in stool? none   Abdominal pain? mild   General Wellbeing? slightly under par  Comment:ftaigue, bloated     Radha also reports the following symptoms in the last month:    Leakage of stool while sleeping? No   Leakage of stool while awake? No       REVIEW OF SYSTEMS:  During the last 7 days, Radha experienced the following symptoms:  Unintentional Weight Loss (in the last month) No   Fever No   Eye irritation No   Mouth sores No   Sore throat No   Chest pain No   Shortness of breath No   Numbness or tingling in hands or feet No   Skin rash No   Pain or swelling in joints No   Bruising or bleeding No   Felt depressed or blue No   Fatigue Yes   Dysuria No   Please see HPI for additional pertinent review of systems; otherwise remainder of ROS was unremarkable    MEDICATIONS:    Current Outpatient Medications:     mesalamine (CANASA) 1,000 mg suppository    mesalamine (LIALDA) 1.2 g EC tablet    ALLERGIES:  No Known Allergies    OBJECTIVE:  /52 (BP Location: Left arm, Patient Position: Sitting, Cuff Size: Standard)   Pulse 81   Temp (!) 97.2  "°F (36.2 °C) (Tympanic)   Ht 5' 10\" (1.778 m)   Wt 80.9 kg (178 lb 6.4 oz)   SpO2 97%   BMI 25.60 kg/m²      PHYSICAL EXAM:    General Appearance:   Alert, cooperative, no distress   HEENT:   Normocephalic, atraumatic, anicteric.     Neck:  Supple, symmetrical, trachea midline   Lungs:   Clear to auscultation bilaterally; no rales, rhonchi or wheezing; respirations unlabored    Heart::   Regular rate and rhythm; no murmur, rub, or gallop.   Abdomen:   Soft, non-distended; normal bowel sounds    Abdominal exam was notable for mild tenderness with   mass. Comment: Mild lower abdominal ttp.   Genitalia:   Deferred    Rectal:   Perirectal assessment                       Extremities:  No cyanosis, clubbing or edema    Pulses:  2+ and symmetric    Skin:  No jaundice, rashes, or lesions    Lymph nodes:  No palpable cervical lymphadenopathy        Lab Results   Component Value Date    WBC 9.55 05/05/2022    HGB 14.2 05/05/2022    HCT 43.4 05/05/2022    MCV 83 05/05/2022     05/05/2022     Lab Results   Component Value Date    SODIUM 135 (L) 05/05/2022    K 3.7 05/05/2022     05/05/2022    CO2 29 05/05/2022    AGAP 3 (L) 05/05/2022    BUN 12 05/05/2022    CREATININE 1.04 05/05/2022    GLUF 81 05/05/2022    CALCIUM 9.7 05/05/2022    AST 16 05/05/2022    ALT 25 05/05/2022    ALKPHOS 76 05/05/2022    TP 8.0 05/05/2022    TBILI 0.88 05/05/2022    EGFR 102 05/05/2022     Lab Results   Component Value Date    CRP 8.1 (H) 05/30/2024     No results found for: \"EPLKEHAE55\"  No results found for: \"FERRITIN\"    ASSESSMENT AND PLAN:    Radha has a history of macroscopic proctitis and microscopic  proctitis ulcerative colitis diagnosed in 2022. Currently off medical therapy. My global assessment is that the clinical disease is currently mildy active.     The 6-point Dubois score was 0 and the 9-point Dubois score was 1.  The short CDAI was 128.      Radha Mccrary has mild ulcerative proctitis diagnosed in 2022.  He is " currently not on therapy.  Recent colonoscopy showed mild active disease in the distal rectum.  His bowel function is normal.  He has iron deficiency and occasional abdominal pain which may or may not be related to his IBD.  We had a detailed discussion about ulcerative colitis, prognosis, and treatment.  I recommend staying on mesalamine therapy to prevent worsening of his disease.    1.  Restart mesalamine 4.8 g daily.  2.  Also start Canasa suppositories for 2 to 3 weeks.  3.  Complete IV iron infusions.  4.  We are going to follow-up on his labs done [facility].    Follow-up in 6 months.      Health Maintenance Recommendations:  Vaccines & Infections  COVID-19 vaccination and boosters are recommended. There is no evidence that the COVID-19 vaccine would cause an IBD flare.  Avoid live vaccines if on immunosuppressive therapy.  Yearly influenza vaccine (flu shot).  Pneumonia vaccines for patients on immunosuppression. These include Prevnar 20, followed by Pneumovax 23 at least 8 weeks later.  Shingrix vaccine (series of 2 injections) for al patients 65 and older. Patients on tofacitinib or upadacitinib should be vaccinated regardless of age.  If not immune to measles mumps or rubella, MMR vaccine is recommended. However, this is a live vaccine and should be given prior to immunosuppressive therapy.  HPV vaccination as per national guidelines.  Hepatitis A and B vaccinations if not previously vaccinated.  Testing for tuberculosis with QuantiFERON Gold blood test and/or chest xray prior to starting immunosuppressive medications, and then annually    Cancer screening  Dysplasia surveillance for colorectal cancer. Colonoscopy in all patients with extensive colitis (more than 1/3 of the colon involved) who had disease for at least 8 or more years.  Repeat colonoscopy approximately every 12-24 months.  In patients with concurrent primary sclerosing cholangitis, history of dysplasia, or family history of colon cancer,  repeat colonoscopy annually.    Females: Pap smear annually for woman on immunosuppression.  Annual dermatologic/skin exam in all patients with IBD, especially those on immunosuppression with thiopurines or CARL inhibitors.    Miscellaneous  DEXA scan, once off steroids for 3 months  Depression screening recommended annually  Routine dental and ophthalmology examinations    Problem List Items Addressed This Visit       Ulcerative proctitis without complication (HCC) - Primary    Relevant Medications    mesalamine (LIALDA) 1.2 g EC tablet    mesalamine (CANASA) 1,000 mg suppository       Marcia Toledo MD

## 2025-05-02 ENCOUNTER — HOSPITAL ENCOUNTER (OUTPATIENT)
Dept: INFUSION CENTER | Facility: CLINIC | Age: 25
End: 2025-05-02
Payer: COMMERCIAL

## 2025-05-02 VITALS
SYSTOLIC BLOOD PRESSURE: 121 MMHG | DIASTOLIC BLOOD PRESSURE: 69 MMHG | HEART RATE: 74 BPM | TEMPERATURE: 98.9 F | RESPIRATION RATE: 16 BRPM

## 2025-05-02 DIAGNOSIS — D50.0 IRON DEFICIENCY ANEMIA DUE TO CHRONIC BLOOD LOSS: Primary | ICD-10-CM

## 2025-05-02 PROCEDURE — 96365 THER/PROPH/DIAG IV INF INIT: CPT

## 2025-05-02 RX ORDER — SODIUM CHLORIDE 9 MG/ML
20 INJECTION, SOLUTION INTRAVENOUS ONCE
Status: CANCELLED | OUTPATIENT
Start: 2025-05-02

## 2025-05-02 RX ORDER — SODIUM CHLORIDE 9 MG/ML
20 INJECTION, SOLUTION INTRAVENOUS ONCE
Status: COMPLETED | OUTPATIENT
Start: 2025-05-02 | End: 2025-05-02

## 2025-05-02 RX ADMIN — SODIUM CHLORIDE 20 ML/HR: 0.9 INJECTION, SOLUTION INTRAVENOUS at 10:45

## 2025-05-02 RX ADMIN — IRON SUCROSE 200 MG: 20 INJECTION, SOLUTION INTRAVENOUS at 10:51

## 2025-05-02 NOTE — PLAN OF CARE
Problem: INFECTION - ADULT  Goal: Absence or prevention of progression during hospitalization  Description: INTERVENTIONS:- Assess and monitor for signs and symptoms of infection- Monitor lab/diagnostic results- Monitor all insertion sites, i.e. indwelling lines, tubes, and drains- Monitor endotracheal if appropriate and nasal secretions for changes in amount and color- [place] appropriate cooling/warming therapies per order- Administer medications as ordered- Instruct and encourage patient and family to use good hand hygiene technique- Identify and instruct in appropriate isolation precautions for identified infection/condition  5/2/2025 1046 by Charleen Aragon RN  Outcome: Progressing  5/2/2025 1046 by Charleen Aragon RN  Outcome: Progressing  Goal: Absence of fever/infection during neutropenic period  Description: INTERVENTIONS:- Monitor WBC  5/2/2025 1046 by Charleen Aragon RN  Outcome: Progressing  5/2/2025 1046 by Charleen Aragon RN  Outcome: Progressing     Problem: Knowledge Deficit  Goal: Patient/family/caregiver demonstrates understanding of disease process, treatment plan, medications, and discharge instructions  Description: Complete learning assessment and assess knowledge base.Interventions:- Provide teaching at level of understanding- Provide teaching via preferred learning methods  5/2/2025 1046 by Charleen Aragon RN  Outcome: Progressing  5/2/2025 1046 by Charleen Aragon RN  Outcome: Progressing

## 2025-05-02 NOTE — PROGRESS NOTES
Pt. Denies new symptoms or concerns today. Pt. Tolerated last ordered Venofer without adverse event. Pt. Will follow up with physician to request lab ordered they have discussed. AVS declined.

## 2025-05-02 NOTE — PLAN OF CARE
Problem: INFECTION - ADULT  Goal: Absence or prevention of progression during hospitalization  Description: INTERVENTIONS:- Assess and monitor for signs and symptoms of infection- Monitor lab/diagnostic results- Monitor all insertion sites, i.e. indwelling lines, tubes, and drains- Monitor endotracheal if appropriate and nasal secretions for changes in amount and color- [place] appropriate cooling/warming therapies per order- Administer medications as ordered- Instruct and encourage patient and family to use good hand hygiene technique- Identify and instruct in appropriate isolation precautions for identified infection/condition  Outcome: Progressing  Goal: Absence of fever/infection during neutropenic period  Description: INTERVENTIONS:- Monitor WBC  Outcome: Progressing     Problem: Knowledge Deficit  Goal: Patient/family/caregiver demonstrates understanding of disease process, treatment plan, medications, and discharge instructions  Description: Complete learning assessment and assess knowledge base.Interventions:- Provide teaching at level of understanding- Provide teaching via preferred learning methods  Outcome: Progressing

## 2025-05-06 ENCOUNTER — TELEPHONE (OUTPATIENT)
Age: 25
End: 2025-05-06

## 2025-05-06 NOTE — TELEPHONE ENCOUNTER
I called the [facility]  and I spoke with Rosy ( tel :[phone])  regarding to fax the result to our office. Provider the fax number.

## 2025-05-12 DIAGNOSIS — K51.20 ULCERATIVE PROCTITIS WITHOUT COMPLICATION (HCC): Primary | ICD-10-CM

## 2025-05-12 DIAGNOSIS — D50.0 IRON DEFICIENCY ANEMIA DUE TO CHRONIC BLOOD LOSS: ICD-10-CM

## 2025-05-12 DIAGNOSIS — E53.8 VITAMIN B12 DEFICIENCY: ICD-10-CM

## 2025-05-12 RX ORDER — LANOLIN ALCOHOL/MO/W.PET/CERES
1000 CREAM (GRAM) TOPICAL DAILY
Qty: 90 TABLET | Refills: 3 | Status: SHIPPED | OUTPATIENT
Start: 2025-05-12

## 2025-05-12 RX ORDER — FERROUS SULFATE 324(65)MG
324 TABLET, DELAYED RELEASE (ENTERIC COATED) ORAL EVERY OTHER DAY
Qty: 90 TABLET | Refills: 1 | Status: SHIPPED | OUTPATIENT
Start: 2025-05-12